# Patient Record
Sex: FEMALE | Race: WHITE | NOT HISPANIC OR LATINO | Employment: OTHER | URBAN - NONMETROPOLITAN AREA
[De-identification: names, ages, dates, MRNs, and addresses within clinical notes are randomized per-mention and may not be internally consistent; named-entity substitution may affect disease eponyms.]

---

## 2023-04-10 ENCOUNTER — TELEPHONE (OUTPATIENT)
Dept: PRIMARY CARE | Facility: CLINIC | Age: 68
End: 2023-04-10
Payer: MEDICARE

## 2023-04-10 NOTE — TELEPHONE ENCOUNTER
Call and tell her to return to the original dose of the hydrochlorothiazide 25 mg once a day and send me results of blood pressures for 1 week,

## 2023-04-10 NOTE — TELEPHONE ENCOUNTER
Pt calling said has been working with you on her bp medications and you upped the hydrochlorothiazide from 25 mg to 50 mg and and bp was looking better but has been waking up dizzy.  Saturday morning was the worse so she went back to 25 mg for the last 3 days and this morning her bp was 155/80 when in office to get her pap.    Please advise?    She said she did send you bp readings to your email.   Ok to leave a message on home phone.

## 2023-05-22 DIAGNOSIS — I10 HYPERTENSION, UNSPECIFIED TYPE: Primary | ICD-10-CM

## 2023-05-22 RX ORDER — HYDROCHLOROTHIAZIDE 25 MG/1
25 TABLET ORAL DAILY
Qty: 90 TABLET | Refills: 0 | Status: SHIPPED | OUTPATIENT
Start: 2023-05-22 | End: 2023-08-21 | Stop reason: SDUPTHER

## 2023-05-22 RX ORDER — HYDROCHLOROTHIAZIDE 25 MG/1
1 TABLET ORAL DAILY
COMMUNITY
Start: 2013-06-19 | End: 2023-08-21 | Stop reason: SDUPTHER

## 2023-07-24 DIAGNOSIS — K21.9 GASTROESOPHAGEAL REFLUX DISEASE WITHOUT ESOPHAGITIS: ICD-10-CM

## 2023-07-24 DIAGNOSIS — M17.0 PRIMARY OSTEOARTHRITIS OF BOTH KNEES: Primary | ICD-10-CM

## 2023-07-24 RX ORDER — MELOXICAM 7.5 MG/1
7.5 TABLET ORAL DAILY
Qty: 90 TABLET | Refills: 3 | Status: SHIPPED | OUTPATIENT
Start: 2023-07-24

## 2023-07-24 RX ORDER — MELOXICAM 7.5 MG/1
7.5 TABLET ORAL DAILY
COMMUNITY
Start: 2015-01-24 | End: 2023-08-21 | Stop reason: SDUPTHER

## 2023-07-24 RX ORDER — OMEPRAZOLE 20 MG/1
20 CAPSULE, DELAYED RELEASE ORAL
COMMUNITY
Start: 2013-06-02 | End: 2023-08-21 | Stop reason: SDUPTHER

## 2023-07-24 RX ORDER — OMEPRAZOLE 20 MG/1
20 CAPSULE, DELAYED RELEASE ORAL
Qty: 90 CAPSULE | Refills: 3 | Status: SHIPPED | OUTPATIENT
Start: 2023-07-24 | End: 2024-05-23

## 2023-08-18 NOTE — PROGRESS NOTES
"Subjective   Patient ID: Kavya Merchant is a 67 y.o. female who presents for Medicare Annual Wellness Visit Subsequent and Hypertension (Discuss leg edema, ).    Past Medical, Surgical, and Family History reviewed and updated in chart.     Reviewed all medications by prescribing practitioner or clinical pharmacist (such as prescriptions, OTCs, herbal therapies and supplements) and documented in the medical record.      HPI    Encounter for subsequent AWV: Medicare wellness visit done, patient is in satisfactory living circumstances where the patient's needs are met.  This patient is advised to develop or update their living will and provide us a copy for the chart.    Hypertension: The patient is here for follow-up of elevated blood pressure. She is adherent to a low salt diet.  The patient is compliant with medications. Patient denies any side effects to the antihypertensive medications. Blood pressure is well controlled at home.No new myalgias or GI upset on diet control.          Review of Systems   All other systems reviewed and are negative.      Patient Care Team:  Gerardo Clark MD as PCP - General  Gerardo Clark MD as PCP - [employer] PCP     Objective   /71 (BP Location: Right arm, Patient Position: Sitting, BP Cuff Size: Adult)   Pulse 77   Temp 36.3 °C (97.3 °F) (Temporal)   Ht 1.664 m (5' 5.5\")   Wt 99.2 kg (218 lb 12.8 oz)   SpO2 97%   BMI 35.86 kg/m²     Physical Exam  Vitals reviewed.   Constitutional:       Appearance: Normal appearance. She is normal weight.   HENT:      Head: Normocephalic and atraumatic.      Right Ear: Tympanic membrane, ear canal and external ear normal.      Left Ear: Tympanic membrane, ear canal and external ear normal.      Nose: Nose normal.      Mouth/Throat:      Mouth: Mucous membranes are moist.      Pharynx: Oropharynx is clear.   Eyes:      Extraocular Movements: Extraocular movements intact.      Conjunctiva/sclera: Conjunctivae normal.      " Pupils: Pupils are equal, round, and reactive to light.   Neck:      Vascular: No carotid bruit.   Cardiovascular:      Rate and Rhythm: Normal rate and regular rhythm.      Pulses: Normal pulses.      Heart sounds: Normal heart sounds. No murmur heard.  Pulmonary:      Effort: Pulmonary effort is normal. No respiratory distress.      Breath sounds: Normal breath sounds. No wheezing, rhonchi or rales.   Abdominal:      General: Abdomen is flat. Bowel sounds are normal.      Palpations: Abdomen is soft. There is no mass.      Tenderness: There is no abdominal tenderness. There is no guarding.   Musculoskeletal:         General: No swelling or deformity. Normal range of motion.      Cervical back: Normal range of motion and neck supple.      Right lower leg: No edema.      Left lower leg: No edema.   Lymphadenopathy:      Cervical: No cervical adenopathy.   Skin:     General: Skin is warm and dry.      Capillary Refill: Capillary refill takes less than 2 seconds.   Neurological:      General: No focal deficit present.      Mental Status: She is alert and oriented to person, place, and time.   Psychiatric:         Mood and Affect: Mood normal.         Behavior: Behavior normal.         Thought Content: Thought content normal.         Judgment: Judgment normal.             Assessment/Plan   #1 hypertension    Blood pressure stable.  Please stay on current doses of hydrochlorothiazide 25 mg a day by controlling her blood pressure we are reducing risk for heart attack and stroke    2.  Bilateral lower leg edema.    Recommending that you purchase compression stockings to be worn for 5 for extended period of time    If your legs continue to have swelling even when wearing the compression socks please contact me I would recommend getting an ultrasound to look for venous insufficiency    3.  Continue on meloxicam to help with generative arthritis of the knee.    4.  Hypothyroid.  Continue on current doses of thyroid  medication    5.  Continue using all allergy medications to help reduce allergy symptoms.    6.  Please have labs done at your convenience we will call you with those results.  We will check kidney function liver function blood sugar cholesterol and thyroid.  We will make sure copies of results go to your endocrinologist    #For the Medicare wellness you are up-to-date with your shingles vaccine both pneumonia vaccines mammogram and colon cancer screening with colonoscopy 2015    If you otherwise stay healthy I will see you back in 6 months we will call with results of your labs you call me if you your    Follow up in: 6 months or sooner if needed with labs prior.    Scribe Attestation  By signing my name below, IIvon Scribe   attest that this documentation has been prepared under the direction and in the presence of Gerardo Clark MD.

## 2023-08-21 ENCOUNTER — LAB (OUTPATIENT)
Dept: LAB | Facility: LAB | Age: 68
End: 2023-08-21
Payer: MEDICARE

## 2023-08-21 ENCOUNTER — OFFICE VISIT (OUTPATIENT)
Dept: PRIMARY CARE | Facility: CLINIC | Age: 68
End: 2023-08-21
Payer: MEDICARE

## 2023-08-21 VITALS
OXYGEN SATURATION: 97 % | HEART RATE: 77 BPM | SYSTOLIC BLOOD PRESSURE: 131 MMHG | BODY MASS INDEX: 35.17 KG/M2 | TEMPERATURE: 97.3 F | WEIGHT: 218.8 LBS | DIASTOLIC BLOOD PRESSURE: 71 MMHG | HEIGHT: 66 IN

## 2023-08-21 DIAGNOSIS — I10 HYPERTENSION, UNSPECIFIED TYPE: ICD-10-CM

## 2023-08-21 DIAGNOSIS — I10 PRIMARY HYPERTENSION: Primary | ICD-10-CM

## 2023-08-21 DIAGNOSIS — M19.012 PRIMARY OSTEOARTHRITIS OF LEFT SHOULDER: ICD-10-CM

## 2023-08-21 DIAGNOSIS — E03.9 HYPOTHYROIDISM, UNSPECIFIED TYPE: ICD-10-CM

## 2023-08-21 DIAGNOSIS — I10 PRIMARY HYPERTENSION: ICD-10-CM

## 2023-08-21 DIAGNOSIS — M17.0 PRIMARY OSTEOARTHRITIS OF BOTH KNEES: ICD-10-CM

## 2023-08-21 DIAGNOSIS — Z00.00 WELLNESS EXAMINATION: ICD-10-CM

## 2023-08-21 DIAGNOSIS — Z00.00 ROUTINE GENERAL MEDICAL EXAMINATION AT HEALTH CARE FACILITY: ICD-10-CM

## 2023-08-21 DIAGNOSIS — J30.89 ALLERGIC RHINITIS DUE TO OTHER ALLERGIC TRIGGER, UNSPECIFIED SEASONALITY: ICD-10-CM

## 2023-08-21 DIAGNOSIS — R73.01 ELEVATED FASTING BLOOD SUGAR: ICD-10-CM

## 2023-08-21 DIAGNOSIS — E66.01 OBESITY, MORBID (MULTI): ICD-10-CM

## 2023-08-21 DIAGNOSIS — R60.0 BILATERAL LEG EDEMA: ICD-10-CM

## 2023-08-21 PROCEDURE — 36415 COLL VENOUS BLD VENIPUNCTURE: CPT

## 2023-08-21 PROCEDURE — 1125F AMNT PAIN NOTED PAIN PRSNT: CPT | Performed by: FAMILY MEDICINE

## 2023-08-21 PROCEDURE — 85025 COMPLETE CBC W/AUTO DIFF WBC: CPT

## 2023-08-21 PROCEDURE — 3078F DIAST BP <80 MM HG: CPT | Performed by: FAMILY MEDICINE

## 2023-08-21 PROCEDURE — 1036F TOBACCO NON-USER: CPT | Performed by: FAMILY MEDICINE

## 2023-08-21 PROCEDURE — 80053 COMPREHEN METABOLIC PANEL: CPT

## 2023-08-21 PROCEDURE — 1160F RVW MEDS BY RX/DR IN RCRD: CPT | Performed by: FAMILY MEDICINE

## 2023-08-21 PROCEDURE — 83735 ASSAY OF MAGNESIUM: CPT

## 2023-08-21 PROCEDURE — G0439 PPPS, SUBSEQ VISIT: HCPCS | Performed by: FAMILY MEDICINE

## 2023-08-21 PROCEDURE — 1159F MED LIST DOCD IN RCRD: CPT | Performed by: FAMILY MEDICINE

## 2023-08-21 PROCEDURE — 84439 ASSAY OF FREE THYROXINE: CPT

## 2023-08-21 PROCEDURE — 3075F SYST BP GE 130 - 139MM HG: CPT | Performed by: FAMILY MEDICINE

## 2023-08-21 PROCEDURE — 84443 ASSAY THYROID STIM HORMONE: CPT

## 2023-08-21 PROCEDURE — 99214 OFFICE O/P EST MOD 30 MIN: CPT | Performed by: FAMILY MEDICINE

## 2023-08-21 PROCEDURE — 80061 LIPID PANEL: CPT

## 2023-08-21 RX ORDER — OLOPATADINE HYDROCHLORIDE 665 UG/1
2 SPRAY NASAL 2 TIMES DAILY
COMMUNITY
Start: 2014-01-24 | End: 2024-02-13 | Stop reason: ALTCHOICE

## 2023-08-21 RX ORDER — LEVOTHYROXINE SODIUM 112 UG/1
112 TABLET ORAL
COMMUNITY
Start: 2013-11-27 | End: 2023-08-25 | Stop reason: ALTCHOICE

## 2023-08-21 RX ORDER — ALBUTEROL SULFATE 90 UG/1
2 AEROSOL, METERED RESPIRATORY (INHALATION) EVERY 4 HOURS PRN
COMMUNITY
Start: 2014-03-25 | End: 2024-02-13 | Stop reason: ALTCHOICE

## 2023-08-21 RX ORDER — AZELASTINE 1 MG/ML
2 SPRAY, METERED NASAL 2 TIMES DAILY
COMMUNITY
Start: 2023-02-14

## 2023-08-21 RX ORDER — MUPIROCIN 20 MG/G
OINTMENT TOPICAL 2 TIMES DAILY
COMMUNITY

## 2023-08-21 RX ORDER — HYDROCHLOROTHIAZIDE 25 MG/1
25 TABLET ORAL DAILY
Qty: 90 TABLET | Refills: 3 | Status: SHIPPED | OUTPATIENT
Start: 2023-08-21 | End: 2024-02-13 | Stop reason: SDUPTHER

## 2023-08-21 RX ORDER — BETAMETHASONE DIPROPIONATE 0.5 MG/G
OINTMENT TOPICAL DAILY
COMMUNITY
Start: 2023-04-10

## 2023-08-21 RX ORDER — METRONIDAZOLE 10 MG/G
GEL TOPICAL DAILY
COMMUNITY
Start: 2013-07-22

## 2023-08-21 RX ORDER — BLACK COHOSH ROOT 540 MG
CAPSULE ORAL
COMMUNITY

## 2023-08-21 RX ORDER — ACETAMINOPHEN 500 MG
250 TABLET ORAL DAILY
COMMUNITY

## 2023-08-21 RX ORDER — HYDROCHLOROTHIAZIDE 25 MG/1
25 TABLET ORAL DAILY
Qty: 90 TABLET | Refills: 3 | Status: SHIPPED | OUTPATIENT
Start: 2023-08-21 | End: 2023-12-05 | Stop reason: WASHOUT

## 2023-08-22 LAB
ALANINE AMINOTRANSFERASE (SGPT) (U/L) IN SER/PLAS: 20 U/L (ref 7–45)
ALBUMIN (G/DL) IN SER/PLAS: 4.6 G/DL (ref 3.4–5)
ALKALINE PHOSPHATASE (U/L) IN SER/PLAS: 92 U/L (ref 33–136)
ANION GAP IN SER/PLAS: 13 MMOL/L (ref 10–20)
ASPARTATE AMINOTRANSFERASE (SGOT) (U/L) IN SER/PLAS: 29 U/L (ref 9–39)
BASOPHILS (10*3/UL) IN BLOOD BY AUTOMATED COUNT: 0.05 X10E9/L (ref 0–0.1)
BASOPHILS/100 LEUKOCYTES IN BLOOD BY AUTOMATED COUNT: 0.8 % (ref 0–2)
BILIRUBIN TOTAL (MG/DL) IN SER/PLAS: 0.7 MG/DL (ref 0–1.2)
CALCIUM (MG/DL) IN SER/PLAS: 10.3 MG/DL (ref 8.6–10.6)
CARBON DIOXIDE, TOTAL (MMOL/L) IN SER/PLAS: 29 MMOL/L (ref 21–32)
CHLORIDE (MMOL/L) IN SER/PLAS: 101 MMOL/L (ref 98–107)
CHOLESTEROL (MG/DL) IN SER/PLAS: 134 MG/DL (ref 0–199)
CHOLESTEROL IN HDL (MG/DL) IN SER/PLAS: 59.4 MG/DL
CHOLESTEROL/HDL RATIO: 2.3
CREATININE (MG/DL) IN SER/PLAS: 0.94 MG/DL (ref 0.5–1.05)
EOSINOPHILS (10*3/UL) IN BLOOD BY AUTOMATED COUNT: 0.16 X10E9/L (ref 0–0.7)
EOSINOPHILS/100 LEUKOCYTES IN BLOOD BY AUTOMATED COUNT: 2.7 % (ref 0–6)
ERYTHROCYTE DISTRIBUTION WIDTH (RATIO) BY AUTOMATED COUNT: 13.2 % (ref 11.5–14.5)
ERYTHROCYTE MEAN CORPUSCULAR HEMOGLOBIN CONCENTRATION (G/DL) BY AUTOMATED: 31.6 G/DL (ref 32–36)
ERYTHROCYTE MEAN CORPUSCULAR VOLUME (FL) BY AUTOMATED COUNT: 93 FL (ref 80–100)
ERYTHROCYTES (10*6/UL) IN BLOOD BY AUTOMATED COUNT: 4.84 X10E12/L (ref 4–5.2)
GFR FEMALE: 66 ML/MIN/1.73M2
GLUCOSE (MG/DL) IN SER/PLAS: 100 MG/DL (ref 74–99)
HEMATOCRIT (%) IN BLOOD BY AUTOMATED COUNT: 45 % (ref 36–46)
HEMOGLOBIN (G/DL) IN BLOOD: 14.2 G/DL (ref 12–16)
IMMATURE GRANULOCYTES/100 LEUKOCYTES IN BLOOD BY AUTOMATED COUNT: 0.2 % (ref 0–0.9)
LDL: 43 MG/DL (ref 0–99)
LEUKOCYTES (10*3/UL) IN BLOOD BY AUTOMATED COUNT: 6 X10E9/L (ref 4.4–11.3)
LYMPHOCYTES (10*3/UL) IN BLOOD BY AUTOMATED COUNT: 1.99 X10E9/L (ref 1.2–4.8)
LYMPHOCYTES/100 LEUKOCYTES IN BLOOD BY AUTOMATED COUNT: 33.4 % (ref 13–44)
MAGNESIUM (MG/DL) IN SER/PLAS: 1.91 MG/DL (ref 1.6–2.4)
MONOCYTES (10*3/UL) IN BLOOD BY AUTOMATED COUNT: 0.53 X10E9/L (ref 0.1–1)
MONOCYTES/100 LEUKOCYTES IN BLOOD BY AUTOMATED COUNT: 8.9 % (ref 2–10)
NEUTROPHILS (10*3/UL) IN BLOOD BY AUTOMATED COUNT: 3.22 X10E9/L (ref 1.2–7.7)
NEUTROPHILS/100 LEUKOCYTES IN BLOOD BY AUTOMATED COUNT: 54 % (ref 40–80)
NRBC (PER 100 WBCS) BY AUTOMATED COUNT: 0 /100 WBC (ref 0–0)
PLATELETS (10*3/UL) IN BLOOD AUTOMATED COUNT: 259 X10E9/L (ref 150–450)
POTASSIUM (MMOL/L) IN SER/PLAS: 3.7 MMOL/L (ref 3.5–5.3)
PROTEIN TOTAL: 7.6 G/DL (ref 6.4–8.2)
SODIUM (MMOL/L) IN SER/PLAS: 139 MMOL/L (ref 136–145)
THYROTROPIN (MIU/L) IN SER/PLAS BY DETECTION LIMIT <= 0.05 MIU/L: 0.25 MIU/L (ref 0.44–3.98)
THYROXINE (T4) FREE (NG/DL) IN SER/PLAS: 1.65 NG/DL (ref 0.78–1.48)
TRIGLYCERIDE (MG/DL) IN SER/PLAS: 158 MG/DL (ref 0–149)
UREA NITROGEN (MG/DL) IN SER/PLAS: 25 MG/DL (ref 6–23)
VLDL: 32 MG/DL (ref 0–40)

## 2023-08-24 ENCOUNTER — TELEPHONE (OUTPATIENT)
Dept: PRIMARY CARE | Facility: CLINIC | Age: 68
End: 2023-08-24
Payer: MEDICARE

## 2023-08-24 NOTE — TELEPHONE ENCOUNTER
Pt calling re her thyroid medication she does not see endo till March 4th and asking if you can please adjust her medication? Please call pt after sent and ok to leave a message.

## 2023-08-25 DIAGNOSIS — E03.9 HYPOTHYROIDISM, UNSPECIFIED TYPE: Primary | ICD-10-CM

## 2023-08-25 RX ORDER — LEVOTHYROXINE SODIUM 100 UG/1
100 TABLET ORAL DAILY
Qty: 30 TABLET | Refills: 11 | Status: SHIPPED | OUTPATIENT
Start: 2023-08-25 | End: 2023-08-28 | Stop reason: SDUPTHER

## 2023-08-25 NOTE — TELEPHONE ENCOUNTER
I sent in the new medication for the lower dose of the thyroid medication and she will need to repeat the labs in 6 weeks, I placed the order for the labs

## 2023-08-25 NOTE — TELEPHONE ENCOUNTER
The patient is asking that the AMANDA be removed from the medication so that she may have the generic form of the medication.

## 2023-08-28 PROBLEM — J30.9 ALLERGIC RHINITIS: Status: ACTIVE | Noted: 2023-08-28

## 2023-08-28 PROBLEM — M17.9 OSTEOARTHRITIS OF KNEE: Status: ACTIVE | Noted: 2023-08-28

## 2023-08-28 PROBLEM — M19.011 ARTHRITIS OF RIGHT ACROMIOCLAVICULAR JOINT: Status: ACTIVE | Noted: 2023-08-28

## 2023-08-28 PROBLEM — Z00.00 WELLNESS EXAMINATION: Status: ACTIVE | Noted: 2023-08-28

## 2023-08-28 PROBLEM — R60.0 BILATERAL LEG EDEMA: Status: ACTIVE | Noted: 2023-08-28

## 2023-08-28 PROBLEM — E55.9 VITAMIN D DEFICIENCY: Status: ACTIVE | Noted: 2017-02-27

## 2023-08-28 PROBLEM — M19.019 DEGENERATIVE ARTHRITIS OF SHOULDER REGION: Status: ACTIVE | Noted: 2023-08-28

## 2023-08-28 PROBLEM — L71.9 ACNE ROSACEA: Status: ACTIVE | Noted: 2023-08-28

## 2023-08-28 PROBLEM — R73.01 ELEVATED FASTING BLOOD SUGAR: Status: ACTIVE | Noted: 2023-08-28

## 2023-08-28 RX ORDER — LEVOTHYROXINE SODIUM 100 UG/1
100 TABLET ORAL DAILY
Qty: 30 TABLET | Refills: 11 | Status: SHIPPED | OUTPATIENT
Start: 2023-08-28 | End: 2024-08-27

## 2023-08-28 NOTE — TELEPHONE ENCOUNTER
I sent in a new prescription for the Synthroid 100 mcg once a day.  I removed the dispense as written label

## 2023-10-11 ENCOUNTER — LAB (OUTPATIENT)
Dept: LAB | Facility: LAB | Age: 68
End: 2023-10-11
Payer: MEDICARE

## 2023-10-11 DIAGNOSIS — E03.9 HYPOTHYROIDISM, UNSPECIFIED TYPE: ICD-10-CM

## 2023-10-11 PROCEDURE — 36415 COLL VENOUS BLD VENIPUNCTURE: CPT

## 2023-10-11 PROCEDURE — 84443 ASSAY THYROID STIM HORMONE: CPT

## 2023-10-12 LAB — TSH SERPL-ACNC: 1.12 MIU/L (ref 0.44–3.98)

## 2023-10-20 ENCOUNTER — TELEPHONE (OUTPATIENT)
Dept: PRIMARY CARE | Facility: CLINIC | Age: 68
End: 2023-10-20
Payer: MEDICARE

## 2023-10-20 DIAGNOSIS — I10 PRIMARY HYPERTENSION: Primary | ICD-10-CM

## 2023-10-20 NOTE — TELEPHONE ENCOUNTER
"Patient calling regarding blood work     Her life insurance was running out, she applied to extend it and they sent in blood work/urine samples to insurance companies    Her rate increased from $97 to $166 because indicators on blood work showed she may have a kidney issue     She was requesting repeat labs be put in since she is now concerned however I told her this was done in August and you noted \"normal kidney function\".    She will try to find another company but she wanted this sent as an FYI       "

## 2023-10-20 NOTE — TELEPHONE ENCOUNTER
Her GFR was above 60 back in August at her last lab test, this is normal.  I can place another order for a kidney function if she wants,  make sure she is well hydrated when she has the repeat labs

## 2023-12-05 ENCOUNTER — OFFICE VISIT (OUTPATIENT)
Dept: PRIMARY CARE | Facility: CLINIC | Age: 68
End: 2023-12-05
Payer: MEDICARE

## 2023-12-05 VITALS
TEMPERATURE: 97 F | HEART RATE: 74 BPM | OXYGEN SATURATION: 96 % | SYSTOLIC BLOOD PRESSURE: 152 MMHG | BODY MASS INDEX: 35.33 KG/M2 | WEIGHT: 215.6 LBS | DIASTOLIC BLOOD PRESSURE: 72 MMHG

## 2023-12-05 DIAGNOSIS — G51.0 BELL'S PALSY: Primary | ICD-10-CM

## 2023-12-05 DIAGNOSIS — Z00.00 WELLNESS EXAMINATION: ICD-10-CM

## 2023-12-05 PROCEDURE — 1159F MED LIST DOCD IN RCRD: CPT | Performed by: FAMILY MEDICINE

## 2023-12-05 PROCEDURE — 3077F SYST BP >= 140 MM HG: CPT | Performed by: FAMILY MEDICINE

## 2023-12-05 PROCEDURE — 1036F TOBACCO NON-USER: CPT | Performed by: FAMILY MEDICINE

## 2023-12-05 PROCEDURE — 1160F RVW MEDS BY RX/DR IN RCRD: CPT | Performed by: FAMILY MEDICINE

## 2023-12-05 PROCEDURE — 1125F AMNT PAIN NOTED PAIN PRSNT: CPT | Performed by: FAMILY MEDICINE

## 2023-12-05 PROCEDURE — 3078F DIAST BP <80 MM HG: CPT | Performed by: FAMILY MEDICINE

## 2023-12-05 PROCEDURE — 99213 OFFICE O/P EST LOW 20 MIN: CPT | Performed by: FAMILY MEDICINE

## 2023-12-05 RX ORDER — LEVOCETIRIZINE DIHYDROCHLORIDE 5 MG/1
TABLET, FILM COATED ORAL
COMMUNITY
Start: 2023-10-21 | End: 2024-02-16 | Stop reason: SDUPTHER

## 2023-12-05 RX ORDER — PREDNISONE 20 MG/1
TABLET ORAL
COMMUNITY
Start: 2023-12-03 | End: 2024-02-13 | Stop reason: ALTCHOICE

## 2023-12-05 RX ORDER — VALACYCLOVIR HYDROCHLORIDE 1 G/1
1000 TABLET, FILM COATED ORAL DAILY
COMMUNITY
Start: 2023-12-03 | End: 2024-02-13 | Stop reason: ALTCHOICE

## 2023-12-05 RX ORDER — BACITRACIN ZINC AND POLYMYXIN B SULFATE 500; 10000 [USP'U]/G; [USP'U]/G
OINTMENT OPHTHALMIC NIGHTLY
Qty: 3.5 G | Refills: 0 | Status: SHIPPED | OUTPATIENT
Start: 2023-12-05 | End: 2023-12-15

## 2023-12-05 ASSESSMENT — ENCOUNTER SYMPTOMS
CARDIOVASCULAR NEGATIVE: 1
ENDOCRINE NEGATIVE: 1
RESPIRATORY NEGATIVE: 1
FACIAL ASYMMETRY: 1
CONSTITUTIONAL NEGATIVE: 1
GASTROINTESTINAL NEGATIVE: 1
EYES NEGATIVE: 1
MUSCULOSKELETAL NEGATIVE: 1
HEMATOLOGIC/LYMPHATIC NEGATIVE: 1
ALLERGIC/IMMUNOLOGIC NEGATIVE: 1
PSYCHIATRIC NEGATIVE: 1

## 2023-12-05 NOTE — PATIENT INSTRUCTIONS
1.  Bell's palsy    You recognized the symptoms quite quickly and appropriately went to the emergency department.  You were correctly placed on prednisone along with Valtrex.    I will add a ointment for your eye to be used at bedtime only if you start noticing you are unable to close the eye at night or you are getting redness to the during the day.    Please finish all medications the prednisone and the Valtrex.  90% of patients are 100% better after 6 weeks.    If you should worsen you need to call me worsening with mean you cannot close your eye at all or the droop on the side of your face is significantly worsening.    You have any symptoms on the left side of your body your left arm or left leg that is a medical emergency you need to go back to the emergency department I do not think that will happen    I will see you back at your next scheduled appointment but am happy to see you sooner if needed

## 2023-12-05 NOTE — PROGRESS NOTES
Subjective   Patient ID: Kavya Merchant is a 68 y.o. female who presents for Follow-up ([facility] Bell's palsy, ).    HPI     The patient had bells palsy on Saturday and was told by her friend. The patient noticed it more on Sunday. The patient reports edema of the face, eyes were not blinking and a droopy mouth. Her sister had a similar problem and has had damage due to it.    The patient denies any changes in vision, hearing or dental.     The patient maintains they do not have any chest pain, chest tightness or shortness of breath.    They do not experience nausea, emesis, changes in bowel movements or dyspepsia.      Review of Systems   Constitutional: Negative.    HENT: Negative.     Eyes: Negative.    Respiratory: Negative.     Cardiovascular: Negative.    Gastrointestinal: Negative.    Endocrine: Negative.    Genitourinary: Negative.    Musculoskeletal: Negative.    Skin: Negative.    Allergic/Immunologic: Negative.    Neurological:  Positive for facial asymmetry.   Hematological: Negative.    Psychiatric/Behavioral: Negative.         Objective   /72 (BP Location: Left arm, Patient Position: Sitting, BP Cuff Size: Large adult)   Pulse 74   Temp 36.1 °C (97 °F) (Temporal)   Wt 97.8 kg (215 lb 9.6 oz)   SpO2 96%   BMI 35.33 kg/m²     Physical Exam  Constitutional:       Appearance: Normal appearance.   HENT:      Head: Normocephalic and atraumatic.      Nose: Nose normal.   Eyes:      Extraocular Movements: Extraocular movements intact.      Conjunctiva/sclera: Conjunctivae normal.      Pupils: Pupils are equal, round, and reactive to light.   Cardiovascular:      Rate and Rhythm: Normal rate and regular rhythm.      Pulses: Normal pulses.      Heart sounds: Normal heart sounds.   Pulmonary:      Effort: Pulmonary effort is normal.      Breath sounds: Normal breath sounds.   Abdominal:      General: Bowel sounds are normal.      Palpations: Abdomen is soft.   Genitourinary:     General: Normal vulva.       Rectum: Normal.   Musculoskeletal:         General: Normal range of motion.      Cervical back: Normal range of motion and neck supple.   Skin:     General: Skin is warm.   Neurological:      Mental Status: She is alert and oriented to person, place, and time.      Comments: Cranial nerve 7 was abnormal on the left side of the face. The rest of the cranial nerves were normal. The rest of her strength and reflexes are normal.   Psychiatric:         Mood and Affect: Mood normal.         Behavior: Behavior normal.         Thought Content: Thought content normal.         Judgment: Judgment normal.         Assessment/Plan   Problem List Items Addressed This Visit             ICD-10-CM    Wellness examination Z00.00    Bell's palsy - Primary G51.0    Relevant Medications    bacitracin-polymyxin B (Polysporin) ophthalmic ointment          1. Bell's palsy        2. Wellness examination          1.  Bell's palsy    You recognized the symptoms quite quickly and appropriately went to the emergency department.  You were correctly placed on prednisone along with Valtrex.    I will add a ointment for your eye to be used at bedtime only if you start noticing you are unable to close the eye at night or you are getting redness to the during the day.    Please finish all medications the prednisone and the Valtrex.  90% of patients are 100% better after 6 weeks.    If you should worsen you need to call me worsening with mean you cannot close your eye at all or the droop on the side of your face is significantly worsening.    You have any symptoms on the left side of your body your left arm or left leg that is a medical emergency you need to go back to the emergency department I do not think that will happen    I will see you back at your next scheduled appointment but am happy to see you sooner if needed.    Follow-up in 6 months or sooner if there are any concerns.    Scribe Attestation  By signing my name below, I, Funmi Myles,  Scribe   attest that this documentation has been prepared under the direction and in the presence of Gerardo Clark MD on 12/05/2023 at 10:35am EST.

## 2024-02-13 ENCOUNTER — LAB (OUTPATIENT)
Dept: LAB | Facility: LAB | Age: 69
End: 2024-02-13
Payer: MEDICARE

## 2024-02-13 ENCOUNTER — OFFICE VISIT (OUTPATIENT)
Dept: PRIMARY CARE | Facility: CLINIC | Age: 69
End: 2024-02-13
Payer: MEDICARE

## 2024-02-13 VITALS
BODY MASS INDEX: 35.99 KG/M2 | WEIGHT: 219.6 LBS | HEART RATE: 79 BPM | SYSTOLIC BLOOD PRESSURE: 128 MMHG | DIASTOLIC BLOOD PRESSURE: 70 MMHG | TEMPERATURE: 97.9 F | OXYGEN SATURATION: 98 %

## 2024-02-13 DIAGNOSIS — Z00.00 WELLNESS EXAMINATION: ICD-10-CM

## 2024-02-13 DIAGNOSIS — E55.9 VITAMIN D DEFICIENCY: ICD-10-CM

## 2024-02-13 DIAGNOSIS — I10 HYPERTENSION, UNSPECIFIED TYPE: ICD-10-CM

## 2024-02-13 DIAGNOSIS — I10 PRIMARY HYPERTENSION: ICD-10-CM

## 2024-02-13 DIAGNOSIS — J30.89 ALLERGIC RHINITIS DUE TO OTHER ALLERGIC TRIGGER, UNSPECIFIED SEASONALITY: ICD-10-CM

## 2024-02-13 DIAGNOSIS — R73.01 ELEVATED FASTING BLOOD SUGAR: ICD-10-CM

## 2024-02-13 DIAGNOSIS — E03.9 ACQUIRED HYPOTHYROIDISM: ICD-10-CM

## 2024-02-13 DIAGNOSIS — E66.01 SEVERE OBESITY (BMI 35.0-35.9 WITH COMORBIDITY) (MULTI): ICD-10-CM

## 2024-02-13 PROCEDURE — 1157F ADVNC CARE PLAN IN RCRD: CPT | Performed by: FAMILY MEDICINE

## 2024-02-13 PROCEDURE — 1036F TOBACCO NON-USER: CPT | Performed by: FAMILY MEDICINE

## 2024-02-13 PROCEDURE — 1125F AMNT PAIN NOTED PAIN PRSNT: CPT | Performed by: FAMILY MEDICINE

## 2024-02-13 PROCEDURE — 3008F BODY MASS INDEX DOCD: CPT | Performed by: FAMILY MEDICINE

## 2024-02-13 PROCEDURE — 3074F SYST BP LT 130 MM HG: CPT | Performed by: FAMILY MEDICINE

## 2024-02-13 PROCEDURE — 84443 ASSAY THYROID STIM HORMONE: CPT

## 2024-02-13 PROCEDURE — 83036 HEMOGLOBIN GLYCOSYLATED A1C: CPT

## 2024-02-13 PROCEDURE — 99214 OFFICE O/P EST MOD 30 MIN: CPT | Performed by: FAMILY MEDICINE

## 2024-02-13 PROCEDURE — 1159F MED LIST DOCD IN RCRD: CPT | Performed by: FAMILY MEDICINE

## 2024-02-13 PROCEDURE — 36415 COLL VENOUS BLD VENIPUNCTURE: CPT

## 2024-02-13 PROCEDURE — 3078F DIAST BP <80 MM HG: CPT | Performed by: FAMILY MEDICINE

## 2024-02-13 PROCEDURE — 82306 VITAMIN D 25 HYDROXY: CPT

## 2024-02-13 PROCEDURE — 1160F RVW MEDS BY RX/DR IN RCRD: CPT | Performed by: FAMILY MEDICINE

## 2024-02-13 PROCEDURE — 80061 LIPID PANEL: CPT

## 2024-02-13 PROCEDURE — 85025 COMPLETE CBC W/AUTO DIFF WBC: CPT

## 2024-02-13 PROCEDURE — 80053 COMPREHEN METABOLIC PANEL: CPT

## 2024-02-13 RX ORDER — HYDROCHLOROTHIAZIDE 25 MG/1
25 TABLET ORAL DAILY
Qty: 90 TABLET | Refills: 3 | Status: SHIPPED | OUTPATIENT
Start: 2024-02-13

## 2024-02-13 RX ORDER — SEMAGLUTIDE 0.68 MG/ML
0.5 INJECTION, SOLUTION SUBCUTANEOUS
Qty: 3 ML | Refills: 2 | Status: SHIPPED | OUTPATIENT
Start: 2024-02-13 | End: 2024-02-13 | Stop reason: SDUPTHER

## 2024-02-13 RX ORDER — SEMAGLUTIDE 0.68 MG/ML
0.5 INJECTION, SOLUTION SUBCUTANEOUS
Qty: 3 ML | Refills: 2 | Status: SHIPPED | OUTPATIENT
Start: 2024-02-13 | End: 2024-02-19

## 2024-02-13 ASSESSMENT — ENCOUNTER SYMPTOMS
EYES NEGATIVE: 1
ENDOCRINE NEGATIVE: 1
MUSCULOSKELETAL NEGATIVE: 1
PSYCHIATRIC NEGATIVE: 1
CARDIOVASCULAR NEGATIVE: 1
ALLERGIC/IMMUNOLOGIC NEGATIVE: 1
CONSTITUTIONAL NEGATIVE: 1
RESPIRATORY NEGATIVE: 1
NEUROLOGICAL NEGATIVE: 1
HEMATOLOGIC/LYMPHATIC NEGATIVE: 1
GASTROINTESTINAL NEGATIVE: 1

## 2024-02-13 NOTE — PROGRESS NOTES
Subjective   Patient ID: Kavya Merchant is a 68 y.o. female who presents for Follow-up (6 mo fuv thy, bells palsy, meds,).    HPI     The patient mentions that her Bell's Palsy has resolved since first affected since 12/2023. She reports no lasting symptoms.    Obesity:  The patient presents today for an evaluation of morbid obesity. The patient is continuously working on diet and exercise. She has struggled with her weight throughout her entire life. She wants to feel better about her weight and keep it off.    Hypothyroidism: Patient presents for evaluation of thyroid function. Energy wise that patient is well. Shestates that within the last month she has not experienced fatigue. The patient condition is currently stable. The patient is currently compliant with their current medication regimen. There are no further concerns at this time.    The patient denies any changes in vision, hearing or dental.     The patient maintains they do not have any chest pain, chest tightness or shortness of breath.    They do not experience nausea, emesis, changes in bowel movements or dyspepsia.    The patient's colonoscopy is up to date.    The patient's mammogram is up to date.    The patient's vaccinations are up to date.         Review of Systems   Constitutional: Negative.    HENT: Negative.     Eyes: Negative.    Respiratory: Negative.     Cardiovascular: Negative.    Gastrointestinal: Negative.    Endocrine: Negative.    Genitourinary: Negative.    Musculoskeletal: Negative.    Skin: Negative.    Allergic/Immunologic: Negative.    Neurological: Negative.    Hematological: Negative.    Psychiatric/Behavioral: Negative.         Objective   /70   Pulse 79   Temp 36.6 °C (97.9 °F) (Temporal)   Wt 99.6 kg (219 lb 9.6 oz)   SpO2 98%   BMI 35.99 kg/m²     Physical Exam  Constitutional:       Appearance: Normal appearance.   HENT:      Head: Normocephalic and atraumatic.      Nose: Nose normal.   Eyes:      Extraocular  Movements: Extraocular movements intact.      Conjunctiva/sclera: Conjunctivae normal.      Pupils: Pupils are equal, round, and reactive to light.   Cardiovascular:      Rate and Rhythm: Normal rate and regular rhythm.      Pulses: Normal pulses.      Heart sounds: Normal heart sounds.   Pulmonary:      Effort: Pulmonary effort is normal.      Breath sounds: Normal breath sounds.   Abdominal:      General: Bowel sounds are normal.      Palpations: Abdomen is soft.   Genitourinary:     General: Normal vulva.      Rectum: Normal.   Musculoskeletal:         General: Normal range of motion.      Cervical back: Normal range of motion and neck supple.   Skin:     General: Skin is warm.   Neurological:      Mental Status: She is alert and oriented to person, place, and time.   Psychiatric:         Mood and Affect: Mood normal.         Behavior: Behavior normal.         Thought Content: Thought content normal.         Judgment: Judgment normal.         Assessment/Plan   Problem List Items Addressed This Visit             ICD-10-CM    Hypertension I10    Relevant Medications    hydroCHLOROthiazide (HYDRODiuril) 25 mg tablet    Other Relevant Orders    CBC and Auto Differential    Comprehensive Metabolic Panel    Lipid Panel    Elevated fasting blood sugar R73.01    Relevant Orders    Hemoglobin A1C    Hypothyroidism E03.9    Relevant Orders    TSH with reflex to Free T4 if abnormal    Vitamin D deficiency E55.9    Relevant Orders    Vitamin D 25-Hydroxy,Total (for eval of Vitamin D levels)    Wellness examination Z00.00    Relevant Orders    Follow Up In [facility] - PCP - Health Maintenance     Other Visit Diagnoses         Codes    BMI 35.0-35.9,adult    -  Primary Z68.35    Relevant Medications    semaglutide (Ozempic) 0.25 mg or 0.5 mg (2 mg/3 mL) pen injector    Severe obesity (BMI 35.0-35.9 with comorbidity) (CMS/[facility])     E66.01, Z68.35    Relevant Medications    semaglutide (Ozempic) 0.25 mg or 0.5 mg (2  mg/3 mL) pen injector               1. BMI 35.0-35.9,adult  semaglutide (Ozempic) 0.25 mg or 0.5 mg (2 mg/3 mL) pen injector    DISCONTINUED: semaglutide (Ozempic) 0.25 mg or 0.5 mg (2 mg/3 mL) pen injector      2. Primary hypertension  Follow Up In [facility] - PCP - Established      3. Hypertension, unspecified type  CBC and Auto Differential    Comprehensive Metabolic Panel    Lipid Panel    hydroCHLOROthiazide (HYDRODiuril) 25 mg tablet      4. Wellness examination  Follow Up In [facility] - PCP - Health Maintenance      5. Elevated fasting blood sugar  Hemoglobin A1C      6. Acquired hypothyroidism  TSH with reflex to Free T4 if abnormal      7. Vitamin D deficiency  Vitamin D 25-Hydroxy,Total (for eval of Vitamin D levels)      8. Severe obesity (BMI 35.0-35.9 with comorbidity) (CMS/HCC)  semaglutide (Ozempic) 0.25 mg or 0.5 mg (2 mg/3 mL) pen injector    DISCONTINUED: semaglutide (Ozempic) 0.25 mg or 0.5 mg (2 mg/3 mL) pen injector        1.  Hypertension    Repeat blood pressure normal    Please stay on current doses of hydrochlorothiazide 25 mg a day by lowering your blood pressure we are reducing risk for heart attack and stroke    We will check electrolytes kidney function with your next set of labs    2.  Hypothyroid    Continue on current dose of levothyroxine 100 mcg a day.  With your neck set of labs we will check her thyroid function as well.  Send copies to your endocrinologist.    3.  Weight loss.    We had a very good discussion regards to your symptoms associated with your weight loss.  You are trying to follow healthy weight watchers diet.  Unfortunately you have not been successful at losing weight.  We discussed the advantages and disadvantages of using medication that will help suppress her appetite.  I am sending in a prescription for Ozempic 0.25 mg injection once a week for 4 weeks please send me a message through [facility] if you are tolerating well with minimal to no  side effects we will increase to 0.5 mg for the second 4 weeks again you will need to message me.  We will slowly increase monthly into your up to 2 mg a week.  In addition and equally as important following the healthy weight watchers diet and trying to walk or exercise be active 30 minutes a day is ideal    4.  Degenerative arthritis knees.  Continue on the meloxicam    5.  Leg edema.  Continue using compression stockings when traveling    6.  GERD.  Continue on the omeprazole for stomach acid    7.  If you are successful with your weight loss your labs returned normal and you remain healthy I will see you in 6 months but we will be in communication through [employer] in regards to your labs and how well you are doing with the Ozempic    Follow-up in 6 months or sooner if there are any concerns.    Scribe Attestation  By signing my name below, IFunmi, Scribe   attest that this documentation has been prepared under the direction and in the presence of Gerardo Clark MD.    This note has been transcribed using a medical scribe and there is a possibility of unintentional typing misprints.

## 2024-02-13 NOTE — PATIENT INSTRUCTIONS
1.  Hypertension    Repeat blood pressure normal    Please stay on current doses of hydrochlorothiazide 25 mg a day by lowering your blood pressure we are reducing risk for heart attack and stroke    We will check electrolytes kidney function with your next set of labs    2.  Hypothyroid    Continue on current dose of levothyroxine 100 mcg a day.  With your neck set of labs we will check her thyroid function as well.  Send copies to your endocrinologist.    3.  Weight loss.    We had a very good discussion regards to your symptoms associated with your weight loss.  You are trying to follow healthy weight watchers diet.  Unfortunately you have not been successful at losing weight.  We discussed the advantages and disadvantages of using medication that will help suppress her appetite.  I am sending in a prescription for Ozempic 0.25 mg injection once a week for 4 weeks please send me a message through [employer] if you are tolerating well with minimal to no side effects we will increase to 0.5 mg for the second 4 weeks again you will need to message me.  We will slowly increase monthly into your up to 2 mg a week.  In addition and equally as important following the healthy weight watchers diet and trying to walk or exercise be active 30 minutes a day is ideal    4.  Degenerative arthritis knees.  Continue on the meloxicam    5.  Leg edema.  Continue using compression stockings when traveling    6.  GERD.  Continue on the omeprazole for stomach acid    7.  If you are successful with your weight loss your labs returned normal and you remain healthy I will see you in 6 months but we will be in communication through [employer] in regards to your labs and how well you are doing with the Ozempic

## 2024-02-13 NOTE — TELEPHONE ENCOUNTER
Pt seen today rx for ozempic and the cost is going to be over $300. Said pharm said will need a prior auth. Pt is requesting PA.

## 2024-02-14 DIAGNOSIS — E03.9 HYPOTHYROIDISM, UNSPECIFIED TYPE: ICD-10-CM

## 2024-02-14 DIAGNOSIS — E55.9 VITAMIN D DEFICIENCY: ICD-10-CM

## 2024-02-14 DIAGNOSIS — J30.89 ALLERGIC RHINITIS DUE TO OTHER ALLERGIC TRIGGER, UNSPECIFIED SEASONALITY: ICD-10-CM

## 2024-02-14 DIAGNOSIS — R73.01 ELEVATED FASTING BLOOD SUGAR: ICD-10-CM

## 2024-02-14 DIAGNOSIS — E78.1 HYPERTRIGLYCERIDEMIA: ICD-10-CM

## 2024-02-14 DIAGNOSIS — Z00.00 WELLNESS EXAMINATION: ICD-10-CM

## 2024-02-14 LAB
25(OH)D3 SERPL-MCNC: 31 NG/ML (ref 30–100)
ALBUMIN SERPL BCP-MCNC: 4.5 G/DL (ref 3.4–5)
ALP SERPL-CCNC: 80 U/L (ref 33–136)
ALT SERPL W P-5'-P-CCNC: 15 U/L (ref 7–45)
ANION GAP SERPL CALC-SCNC: 13 MMOL/L (ref 10–20)
AST SERPL W P-5'-P-CCNC: 23 U/L (ref 9–39)
BASOPHILS # BLD AUTO: 0.05 X10*3/UL (ref 0–0.1)
BASOPHILS NFR BLD AUTO: 0.9 %
BILIRUB SERPL-MCNC: 0.7 MG/DL (ref 0–1.2)
BUN SERPL-MCNC: 20 MG/DL (ref 6–23)
CALCIUM SERPL-MCNC: 10 MG/DL (ref 8.6–10.6)
CHLORIDE SERPL-SCNC: 103 MMOL/L (ref 98–107)
CHOLEST SERPL-MCNC: 149 MG/DL (ref 0–199)
CHOLESTEROL/HDL RATIO: 2.6
CO2 SERPL-SCNC: 31 MMOL/L (ref 21–32)
CREAT SERPL-MCNC: 0.96 MG/DL (ref 0.5–1.05)
EGFRCR SERPLBLD CKD-EPI 2021: 64 ML/MIN/1.73M*2
EOSINOPHIL # BLD AUTO: 0.21 X10*3/UL (ref 0–0.7)
EOSINOPHIL NFR BLD AUTO: 3.8 %
ERYTHROCYTE [DISTWIDTH] IN BLOOD BY AUTOMATED COUNT: 13.5 % (ref 11.5–14.5)
EST. AVERAGE GLUCOSE BLD GHB EST-MCNC: 114 MG/DL
GLUCOSE SERPL-MCNC: 95 MG/DL (ref 74–99)
HBA1C MFR BLD: 5.6 %
HCT VFR BLD AUTO: 42.8 % (ref 36–46)
HDLC SERPL-MCNC: 56.3 MG/DL
HGB BLD-MCNC: 13.9 G/DL (ref 12–16)
IMM GRANULOCYTES # BLD AUTO: 0.01 X10*3/UL (ref 0–0.7)
IMM GRANULOCYTES NFR BLD AUTO: 0.2 % (ref 0–0.9)
LDLC SERPL CALC-MCNC: 51 MG/DL
LYMPHOCYTES # BLD AUTO: 2.03 X10*3/UL (ref 1.2–4.8)
LYMPHOCYTES NFR BLD AUTO: 36.6 %
MCH RBC QN AUTO: 29.8 PG (ref 26–34)
MCHC RBC AUTO-ENTMCNC: 32.5 G/DL (ref 32–36)
MCV RBC AUTO: 92 FL (ref 80–100)
MONOCYTES # BLD AUTO: 0.4 X10*3/UL (ref 0.1–1)
MONOCYTES NFR BLD AUTO: 7.2 %
NEUTROPHILS # BLD AUTO: 2.85 X10*3/UL (ref 1.2–7.7)
NEUTROPHILS NFR BLD AUTO: 51.3 %
NON HDL CHOLESTEROL: 93 MG/DL (ref 0–149)
NRBC BLD-RTO: 0 /100 WBCS (ref 0–0)
PLATELET # BLD AUTO: 209 X10*3/UL (ref 150–450)
POTASSIUM SERPL-SCNC: 3.7 MMOL/L (ref 3.5–5.3)
PROT SERPL-MCNC: 6.9 G/DL (ref 6.4–8.2)
RBC # BLD AUTO: 4.67 X10*6/UL (ref 4–5.2)
SODIUM SERPL-SCNC: 143 MMOL/L (ref 136–145)
TRIGL SERPL-MCNC: 210 MG/DL (ref 0–149)
TSH SERPL-ACNC: 0.94 MIU/L (ref 0.44–3.98)
VLDL: 42 MG/DL (ref 0–40)
WBC # BLD AUTO: 5.6 X10*3/UL (ref 4.4–11.3)

## 2024-02-14 NOTE — RESULT ENCOUNTER NOTE
Recent labs indicate normal total cholesterol 149 HDL cholesterol normal at 56 LDL cholesterol normal at 51 triglycerides are elevated 210 goal for triglycerides being less than 150.  Vitamin D level 31 normal.  Blood sugar normal at 95 electrolytes are normal kidney function tests are normal liver enzymes are normal.  Thyroid function test is normal indicating you are taking the correct dose of thyroid medication.  Hemoglobin A1c 5.6 indicating no diabetes.  Blood counts are normal indicating no anemia.  Please stay on all current medications keep all future follow-up appointments if possible have lab work done prior to your next appointment so we can discuss the results during the appointment I will place the orders today

## 2024-02-16 RX ORDER — LEVOCETIRIZINE DIHYDROCHLORIDE 5 MG/1
TABLET, FILM COATED ORAL
Qty: 90 TABLET | Refills: 3 | Status: CANCELLED | OUTPATIENT
Start: 2024-02-16

## 2024-02-16 RX ORDER — LEVOCETIRIZINE DIHYDROCHLORIDE 5 MG/1
5 TABLET, FILM COATED ORAL EVERY EVENING
Qty: 90 TABLET | Refills: 3 | Status: SHIPPED | OUTPATIENT
Start: 2024-02-16 | End: 2025-02-15

## 2024-02-19 ENCOUNTER — TELEPHONE (OUTPATIENT)
Dept: PRIMARY CARE | Facility: CLINIC | Age: 69
End: 2024-02-19
Payer: MEDICARE

## 2024-02-26 DIAGNOSIS — E66.01 OBESITY, MORBID (MULTI): Primary | ICD-10-CM

## 2024-03-11 ENCOUNTER — TELEMEDICINE (OUTPATIENT)
Dept: PHARMACY | Facility: HOSPITAL | Age: 69
End: 2024-03-11
Payer: MEDICARE

## 2024-03-11 DIAGNOSIS — E66.01 OBESITY, MORBID (MULTI): ICD-10-CM

## 2024-03-11 PROCEDURE — 3008F BODY MASS INDEX DOCD: CPT | Performed by: FAMILY MEDICINE

## 2024-03-11 PROCEDURE — 1160F RVW MEDS BY RX/DR IN RCRD: CPT | Performed by: FAMILY MEDICINE

## 2024-03-11 PROCEDURE — 1125F AMNT PAIN NOTED PAIN PRSNT: CPT | Performed by: FAMILY MEDICINE

## 2024-03-11 PROCEDURE — 1157F ADVNC CARE PLAN IN RCRD: CPT | Performed by: FAMILY MEDICINE

## 2024-03-11 PROCEDURE — 1159F MED LIST DOCD IN RCRD: CPT | Performed by: FAMILY MEDICINE

## 2024-03-11 PROCEDURE — 1036F TOBACCO NON-USER: CPT | Performed by: FAMILY MEDICINE

## 2024-03-11 NOTE — PROGRESS NOTES
Subjective   Patient ID: Kavya Merchant is a 68 y.o. female who presents for Obesity.    Referring Provider: Gerardo Clark MD     HPI  [facility] significant for hypothyroidism and arthritis. Has participated in Weight Wat[employer] for several years. She keeps active with gardening and yardwork over 2 acres of property. She takes Zepbound on Saturdays and started on 3/2/24. Appetite has decreased and no side effects reported so far. She has lost 4 lbs since 3/2/24.    Review of Systems    Medication System Management:  Affordability/Accessibility: No issues  Adherence/Organization: No issues  Adverse Effects: None    Objective     There were no vitals taken for this visit.     Labs  Lab Results   Component Value Date    BILITOT 0.7 02/13/2024    CALCIUM 10.0 02/13/2024    CO2 31 02/13/2024     02/13/2024    CREATININE 0.96 02/13/2024    GLUCOSE 95 02/13/2024    ALKPHOS 80 02/13/2024    K 3.7 02/13/2024    PROT 6.9 02/13/2024     02/13/2024    AST 23 02/13/2024    ALT 15 02/13/2024    BUN 20 02/13/2024    ANIONGAP 13 02/13/2024    MG 1.91 08/21/2023    ALBUMIN 4.5 02/13/2024    GFRF 66 08/21/2023     Lab Results   Component Value Date    TRIG 210 (H) 02/13/2024    CHOL 149 02/13/2024    LDLCALC 51 02/13/2024    HDL 56.3 02/13/2024     Lab Results   Component Value Date    HGBA1C 5.6 02/13/2024       Current Outpatient Medications on File Prior to Visit   Medication Sig Dispense Refill    azelastine (Astelin) 137 mcg (0.1 %) nasal spray Administer 2 sprays into each nostril 2 times a day.      betamethasone dipropionate (Diprolene) 0.05 % ointment Apply topically once daily.      black cohosh 540 mg capsule Take by mouth.      cholecalciferol (Vitamin D-3) 50 mcg (2,000 unit) capsule Take 5 capsules (250 mcg) by mouth early in the morning..      hydroCHLOROthiazide (HYDRODiuril) 25 mg tablet Take 1 tablet (25 mg) by mouth once daily. 90 tablet 3    levocetirizine (Xyzal) 5 mg tablet Take 1 tablet (5 mg) by mouth once  daily in the evening. 90 tablet 3    levothyroxine (Synthroid) 100 mcg tablet Take 1 tablet (100 mcg) by mouth once daily. 30 tablet 11    meloxicam (Mobic) 7.5 mg tablet TAKE 1 TABLET DAILY 90 tablet 3    metroNIDAZOLE (MetrogeL) 1 % gel Apply topically once daily.      mupirocin (Bactroban) 2 % ointment Apply topically 2 times a day.      omeprazole (PriLOSEC) 20 mg DR capsule TAKE 1 CAPSULE DAILY EVERY MORNING BEFORE BREAKFAST 90 capsule 3    tirzepatide, weight loss, (Zepbound) 2.5 mg/0.5 mL injection Inject 2.5 mg under the skin every 7 days. 4 each 2     No current facility-administered medications on file prior to visit.        Assessment/Plan   Problem List Items Addressed This Visit             ICD-10-CM    Obesity, morbid (CMS/[facility]) E66.01     Patient started Zepbound 2.5 mg on 3/2/24 and has taken 2 doses so far without side effects. She has lost 4 lbs since starting.  -INCREASE Zepbound to 5 mg weekly once finished with Zepbound 2.5 mg.    PharmD Follow-Up: 4/16/24            Torrie Hampton, PharmD    Continue all meds under the continuation of care with the referring provider and clinical pharmacy team.    Verbal consent to manage patient's drug therapy was obtained from the patient. They were informed they may decline to participate or withdraw from participation in pharmacy services at any time.

## 2024-03-11 NOTE — ASSESSMENT & PLAN NOTE
Patient started Zepbound 2.5 mg on 3/2/24 and has taken 2 doses so far without side effects. She has lost 4 lbs since starting.  -INCREASE Zepbound to 5 mg weekly once finished with Zepbound 2.5 mg.    PharmD Follow-Up: 4/16/24

## 2024-04-16 ENCOUNTER — TELEMEDICINE (OUTPATIENT)
Dept: PHARMACY | Facility: HOSPITAL | Age: 69
End: 2024-04-16
Payer: MEDICARE

## 2024-04-16 DIAGNOSIS — E66.01 OBESITY, MORBID (MULTI): ICD-10-CM

## 2024-04-16 NOTE — PROGRESS NOTES
Subjective   Patient ID: Kavya Merchant is a 68 y.o. female who presents for Weight Loss.    Referring Provider: Gerardo Clark MD     [facility] significant for hypothyroidism and arthritis. Has participated in Weight [employer] for several years. She keeps active with gardening and yardwork over 2 acres of property. She takes Zepbound on Saturdays and started on 3/2/24. Appetite has decreased and no side effects reported so far. She has lost 11 lbs since 3/2/24.    Starting Weight: 219 lbs   Current Weight: 208 lbs     Review of Systems    Medication System Management:  Affordability/Accessibility: No issues  Adherence/Organization: No issues  Adverse Effects: None    Objective     There were no vitals taken for this visit.     Labs  Lab Results   Component Value Date    BILITOT 0.7 02/13/2024    CALCIUM 10.0 02/13/2024    CO2 31 02/13/2024     02/13/2024    CREATININE 0.96 02/13/2024    GLUCOSE 95 02/13/2024    ALKPHOS 80 02/13/2024    K 3.7 02/13/2024    PROT 6.9 02/13/2024     02/13/2024    AST 23 02/13/2024    ALT 15 02/13/2024    BUN 20 02/13/2024    ANIONGAP 13 02/13/2024    MG 1.91 08/21/2023    ALBUMIN 4.5 02/13/2024    GFRF 66 08/21/2023     Lab Results   Component Value Date    TRIG 210 (H) 02/13/2024    CHOL 149 02/13/2024    LDLCALC 51 02/13/2024    HDL 56.3 02/13/2024     Lab Results   Component Value Date    HGBA1C 5.6 02/13/2024       Current Outpatient Medications on File Prior to Visit   Medication Sig Dispense Refill    azelastine (Astelin) 137 mcg (0.1 %) nasal spray Administer 2 sprays into each nostril 2 times a day.      betamethasone dipropionate (Diprolene) 0.05 % ointment Apply topically once daily.      black cohosh 540 mg capsule Take by mouth.      cholecalciferol (Vitamin D-3) 50 mcg (2,000 unit) capsule Take 5 capsules (250 mcg) by mouth early in the morning..      hydroCHLOROthiazide (HYDRODiuril) 25 mg tablet Take 1 tablet (25 mg) by mouth once daily. 90 tablet 3     levocetirizine (Xyzal) 5 mg tablet Take 1 tablet (5 mg) by mouth once daily in the evening. 90 tablet 3    levothyroxine (Synthroid) 100 mcg tablet Take 1 tablet (100 mcg) by mouth once daily. 30 tablet 11    meloxicam (Mobic) 7.5 mg tablet TAKE 1 TABLET DAILY 90 tablet 3    metroNIDAZOLE (MetrogeL) 1 % gel Apply topically once daily.      mupirocin (Bactroban) 2 % ointment Apply topically 2 times a day.      omeprazole (PriLOSEC) 20 mg DR capsule TAKE 1 CAPSULE DAILY EVERY MORNING BEFORE BREAKFAST 90 capsule 3    tirzepatide, weight loss, (Zepbound) 5 mg/0.5 mL injection Inject 5 mg under the skin every 7 days. 2 mL 1     No current facility-administered medications on file prior to visit.        Assessment/Plan   Problem List Items Addressed This Visit             ICD-10-CM    Obesity, morbid (Multi) E66.01     Patient started Zepbound 5 mg on 3/2/24 and reports no side effects. She has lost 11 lbs since starting.    PLAN:  -INCREASE to Zepbound to 7.5 mg weekly once finished with Zepbound 5 mg.      PharmD Follow-Up: 7 weeks per patient request due to vacation            Jameson HerreraD    Continue all meds under the continuation of care with the referring provider and clinical pharmacy team.    Verbal consent to manage patient's drug therapy was obtained from the patient. They were informed they may decline to participate or withdraw from participation in pharmacy services at any time.

## 2024-04-16 NOTE — ASSESSMENT & PLAN NOTE
Patient started Zepbound 5 mg on 3/2/24 and reports no side effects. She has lost 11 lbs since starting.    PLAN:  -INCREASE to Zepbound to 7.5 mg weekly once finished with Zepbound 5 mg.      PharmD Follow-Up: 7 weeks per patient request due to vacation

## 2024-04-23 ENCOUNTER — TELEPHONE (OUTPATIENT)
Dept: PRIMARY CARE | Facility: CLINIC | Age: 69
End: 2024-04-23

## 2024-04-23 DIAGNOSIS — E66.01 OBESITY, MORBID (MULTI): Primary | ICD-10-CM

## 2024-04-23 DIAGNOSIS — E66.01 OBESITY, MORBID (MULTI): ICD-10-CM

## 2024-04-23 NOTE — TELEPHONE ENCOUNTER
Meghan from [facility] in [place]      [facility] only has the 2.5 mgof the zepbound. They need the directions for the 2.5 mg of the zepbound to say inject 5 mg every 7 day.       [phone]

## 2024-05-23 DIAGNOSIS — K21.9 GASTROESOPHAGEAL REFLUX DISEASE WITHOUT ESOPHAGITIS: ICD-10-CM

## 2024-05-23 RX ORDER — OMEPRAZOLE 20 MG/1
20 CAPSULE, DELAYED RELEASE ORAL
Qty: 90 CAPSULE | Refills: 3 | Status: SHIPPED | OUTPATIENT
Start: 2024-05-23

## 2024-06-04 ENCOUNTER — TELEMEDICINE (OUTPATIENT)
Dept: PHARMACY | Facility: HOSPITAL | Age: 69
End: 2024-06-04
Payer: MEDICARE

## 2024-06-04 DIAGNOSIS — E66.01 OBESITY, MORBID (MULTI): ICD-10-CM

## 2024-06-04 NOTE — PROGRESS NOTES
Subjective   Patient ID: Kavya Merchant is a 68 y.o. female who presents for Weight Loss.    Referring Provider: Gearrdo Clark MD     [facility] significant for hypothyroidism and arthritis. Has participated in Weight [employer] for several years. She keeps active with gardening and yardwork over 2 acres of property. She takes Zepbound on Saturdays and started on 3/2/24. Appetite has decreased and no side effects reported so far. She has lost 19 lbs since 3/2/24.    Starting Weight: 219 lbs   Current Weight: 203 lbs     Review of Systems    Medication System Management:  Affordability/Accessibility: No issues  Adherence/Organization: No issues  Adverse Effects: None    Objective     There were no vitals taken for this visit.     Labs  Lab Results   Component Value Date    BILITOT 0.7 02/13/2024    CALCIUM 10.0 02/13/2024    CO2 31 02/13/2024     02/13/2024    CREATININE 0.96 02/13/2024    GLUCOSE 95 02/13/2024    ALKPHOS 80 02/13/2024    K 3.7 02/13/2024    PROT 6.9 02/13/2024     02/13/2024    AST 23 02/13/2024    ALT 15 02/13/2024    BUN 20 02/13/2024    ANIONGAP 13 02/13/2024    MG 1.91 08/21/2023    ALBUMIN 4.5 02/13/2024    GFRF 66 08/21/2023     Lab Results   Component Value Date    TRIG 210 (H) 02/13/2024    CHOL 149 02/13/2024    LDLCALC 51 02/13/2024    HDL 56.3 02/13/2024     Lab Results   Component Value Date    HGBA1C 5.6 02/13/2024       Current Outpatient Medications on File Prior to Visit   Medication Sig Dispense Refill    azelastine (Astelin) 137 mcg (0.1 %) nasal spray Administer 2 sprays into each nostril 2 times a day.      betamethasone dipropionate (Diprolene) 0.05 % ointment Apply topically once daily.      black cohosh 540 mg capsule Take by mouth.      cholecalciferol (Vitamin D-3) 50 mcg (2,000 unit) capsule Take 5 capsules (250 mcg) by mouth early in the morning..      hydroCHLOROthiazide (HYDRODiuril) 25 mg tablet Take 1 tablet (25 mg) by mouth once daily. 90 tablet 3     levocetirizine (Xyzal) 5 mg tablet Take 1 tablet (5 mg) by mouth once daily in the evening. 90 tablet 3    levothyroxine (Synthroid) 100 mcg tablet Take 1 tablet (100 mcg) by mouth once daily. 30 tablet 11    meloxicam (Mobic) 7.5 mg tablet TAKE 1 TABLET DAILY 90 tablet 3    metroNIDAZOLE (MetrogeL) 1 % gel Apply topically once daily.      mupirocin (Bactroban) 2 % ointment Apply topically 2 times a day.      omeprazole (PriLOSEC) 20 mg DR capsule TAKE 1 CAPSULE DAILY EVERY MORNING BEFORE BREAKFAST 90 capsule 3    tirzepatide, weight loss, (Zepbound) 2.5 mg/0.5 mL injection Inject 5 mg under the skin every 7 days. 4 mL 3    tirzepatide, weight loss, (Zepbound) 5 mg/0.5 mL injection Inject 5 mg under the skin every 7 days. 2 mL 1    tirzepatide, weight loss, (Zepbound) 7.5 mg/0.5 mL injection Inject 7.5 mg under the skin every 7 days. 2 mL 0     No current facility-administered medications on file prior to visit.        Assessment/Plan   Problem List Items Addressed This Visit             ICD-10-CM    Obesity, morbid (Multi) E66.01     Patient was referred for titration of Zepbound for weight loss.  Since started Zepbound 5 mg on 3/2/24 and reports no side effects. She has lost 19 lbs total.  Patient has 1 month supply of 5 mg at home.  Will discuss increasing the dose at next visit if available at pharmacy.      PLAN:  - Continue Zepbound 5 mg once weekly injection due to backorder of other doses.        PharmD Follow-Up: 3 weeks          Relevant Orders    Follow Up In [facility] - Pharmacy       Snow Wooten PharmD    Continue all meds under the continuation of care with the referring provider and clinical pharmacy team.    Verbal consent to manage patient's drug therapy was obtained from the patient. They were informed they may decline to participate or withdraw from participation in pharmacy services at any time.

## 2024-06-04 NOTE — ASSESSMENT & PLAN NOTE
Patient was referred for titration of Zepbound for weight loss.  Since started Zepbound 5 mg on 3/2/24 and reports no side effects. She has lost 19 lbs total.  Patient has 1 month supply of 5 mg at home.  Will discuss increasing the dose at next visit if available at pharmacy.      PLAN:  - Continue Zepbound 5 mg once weekly injection due to backorder of other doses.        PharmD Follow-Up: 3 weeks

## 2024-06-25 ENCOUNTER — APPOINTMENT (OUTPATIENT)
Dept: PHARMACY | Facility: HOSPITAL | Age: 69
End: 2024-06-25
Payer: MEDICARE

## 2024-06-25 ENCOUNTER — LAB (OUTPATIENT)
Dept: LAB | Facility: LAB | Age: 69
End: 2024-06-25
Payer: MEDICARE

## 2024-06-25 DIAGNOSIS — E66.01 OBESITY, MORBID (MULTI): ICD-10-CM

## 2024-06-25 DIAGNOSIS — E55.9 VITAMIN D DEFICIENCY: ICD-10-CM

## 2024-06-25 DIAGNOSIS — R73.01 ELEVATED FASTING BLOOD SUGAR: ICD-10-CM

## 2024-06-25 DIAGNOSIS — E03.9 HYPOTHYROIDISM, UNSPECIFIED TYPE: ICD-10-CM

## 2024-06-25 DIAGNOSIS — E78.1 HYPERTRIGLYCERIDEMIA: ICD-10-CM

## 2024-06-25 LAB
25(OH)D3 SERPL-MCNC: 38 NG/ML (ref 30–100)
ALBUMIN SERPL BCP-MCNC: 4.3 G/DL (ref 3.4–5)
ALP SERPL-CCNC: 94 U/L (ref 33–136)
ALT SERPL W P-5'-P-CCNC: 14 U/L (ref 7–45)
ANION GAP SERPL CALC-SCNC: 13 MMOL/L (ref 10–20)
AST SERPL W P-5'-P-CCNC: 23 U/L (ref 9–39)
BASOPHILS # BLD AUTO: 0.05 X10*3/UL (ref 0–0.1)
BASOPHILS NFR BLD AUTO: 0.9 %
BILIRUB SERPL-MCNC: 0.7 MG/DL (ref 0–1.2)
BUN SERPL-MCNC: 32 MG/DL (ref 6–23)
CALCIUM SERPL-MCNC: 9.7 MG/DL (ref 8.6–10.6)
CHLORIDE SERPL-SCNC: 104 MMOL/L (ref 98–107)
CHOLEST SERPL-MCNC: 107 MG/DL (ref 0–199)
CHOLESTEROL/HDL RATIO: 1.8
CO2 SERPL-SCNC: 28 MMOL/L (ref 21–32)
CREAT SERPL-MCNC: 1.06 MG/DL (ref 0.5–1.05)
EGFRCR SERPLBLD CKD-EPI 2021: 57 ML/MIN/1.73M*2
EOSINOPHIL # BLD AUTO: 0.21 X10*3/UL (ref 0–0.7)
EOSINOPHIL NFR BLD AUTO: 4 %
ERYTHROCYTE [DISTWIDTH] IN BLOOD BY AUTOMATED COUNT: 13.2 % (ref 11.5–14.5)
EST. AVERAGE GLUCOSE BLD GHB EST-MCNC: 100 MG/DL
GLUCOSE SERPL-MCNC: 95 MG/DL (ref 74–99)
HBA1C MFR BLD: 5.1 %
HCT VFR BLD AUTO: 44.1 % (ref 36–46)
HDLC SERPL-MCNC: 57.9 MG/DL
HGB BLD-MCNC: 14.2 G/DL (ref 12–16)
IMM GRANULOCYTES # BLD AUTO: 0.01 X10*3/UL (ref 0–0.7)
IMM GRANULOCYTES NFR BLD AUTO: 0.2 % (ref 0–0.9)
LDLC SERPL CALC-MCNC: 28 MG/DL
LYMPHOCYTES # BLD AUTO: 1.85 X10*3/UL (ref 1.2–4.8)
LYMPHOCYTES NFR BLD AUTO: 35.1 %
MCH RBC QN AUTO: 29.7 PG (ref 26–34)
MCHC RBC AUTO-ENTMCNC: 32.2 G/DL (ref 32–36)
MCV RBC AUTO: 92 FL (ref 80–100)
MONOCYTES # BLD AUTO: 0.41 X10*3/UL (ref 0.1–1)
MONOCYTES NFR BLD AUTO: 7.8 %
NEUTROPHILS # BLD AUTO: 2.74 X10*3/UL (ref 1.2–7.7)
NEUTROPHILS NFR BLD AUTO: 52 %
NON HDL CHOLESTEROL: 49 MG/DL (ref 0–149)
NRBC BLD-RTO: 0 /100 WBCS (ref 0–0)
PLATELET # BLD AUTO: 241 X10*3/UL (ref 150–450)
POTASSIUM SERPL-SCNC: 3.9 MMOL/L (ref 3.5–5.3)
PROT SERPL-MCNC: 6.9 G/DL (ref 6.4–8.2)
RBC # BLD AUTO: 4.78 X10*6/UL (ref 4–5.2)
SODIUM SERPL-SCNC: 141 MMOL/L (ref 136–145)
TRIGL SERPL-MCNC: 104 MG/DL (ref 0–149)
TSH SERPL-ACNC: 0.72 MIU/L (ref 0.44–3.98)
VLDL: 21 MG/DL (ref 0–40)
WBC # BLD AUTO: 5.3 X10*3/UL (ref 4.4–11.3)

## 2024-06-25 PROCEDURE — 36415 COLL VENOUS BLD VENIPUNCTURE: CPT

## 2024-06-25 PROCEDURE — 84443 ASSAY THYROID STIM HORMONE: CPT

## 2024-06-25 PROCEDURE — 85025 COMPLETE CBC W/AUTO DIFF WBC: CPT

## 2024-06-25 PROCEDURE — 82306 VITAMIN D 25 HYDROXY: CPT

## 2024-06-25 PROCEDURE — 83036 HEMOGLOBIN GLYCOSYLATED A1C: CPT

## 2024-06-25 PROCEDURE — 80061 LIPID PANEL: CPT

## 2024-06-25 PROCEDURE — 80053 COMPREHEN METABOLIC PANEL: CPT

## 2024-06-25 NOTE — PROGRESS NOTES
Subjective   Patient ID: Kavya Merchant is a 68 y.o. female who presents for Weight Loss.    Referring Provider: Gerardo Clark MD     [facility] significant for hypothyroidism and arthritis. Has participated in Weight [employer] for several years. She keeps active with gardening and yardwork over 2 acres of property. She takes Zepbound on Saturdays and started on 3/2/24. Appetite has decreased and no side effects reported so far. She has lost 19 lbs since 3/2/24.    Starting Weight: 219 lbs   Current Weight: 198 lbs     Review of Systems    Medication System Management:  Affordability/Accessibility: No issues  Adherence/Organization: No issues  Adverse Effects: None    Objective     There were no vitals taken for this visit.     Labs  Lab Results   Component Value Date    BILITOT 0.7 06/25/2024    CALCIUM 9.7 06/25/2024    CO2 28 06/25/2024     06/25/2024    CREATININE 1.06 (H) 06/25/2024    GLUCOSE 95 06/25/2024    ALKPHOS 94 06/25/2024    K 3.9 06/25/2024    PROT 6.9 06/25/2024     06/25/2024    AST 23 06/25/2024    ALT 14 06/25/2024    BUN 32 (H) 06/25/2024    ANIONGAP 13 06/25/2024    MG 1.91 08/21/2023    ALBUMIN 4.3 06/25/2024    GFRF 66 08/21/2023     Lab Results   Component Value Date    TRIG 104 06/25/2024    CHOL 107 06/25/2024    LDLCALC 28 06/25/2024    HDL 57.9 06/25/2024     Lab Results   Component Value Date    HGBA1C 5.1 06/25/2024       Current Outpatient Medications on File Prior to Visit   Medication Sig Dispense Refill    azelastine (Astelin) 137 mcg (0.1 %) nasal spray Administer 2 sprays into each nostril 2 times a day.      betamethasone dipropionate (Diprolene) 0.05 % ointment Apply topically once daily.      black cohosh 540 mg capsule Take by mouth.      cholecalciferol (Vitamin D-3) 50 mcg (2,000 unit) capsule Take 5 capsules (250 mcg) by mouth early in the morning..      hydroCHLOROthiazide (HYDRODiuril) 25 mg tablet Take 1 tablet (25 mg) by mouth once daily. 90 tablet 3     levocetirizine (Xyzal) 5 mg tablet Take 1 tablet (5 mg) by mouth once daily in the evening. 90 tablet 3    levothyroxine (Synthroid) 100 mcg tablet Take 1 tablet (100 mcg) by mouth once daily. 30 tablet 11    meloxicam (Mobic) 7.5 mg tablet TAKE 1 TABLET DAILY 90 tablet 3    metroNIDAZOLE (MetrogeL) 1 % gel Apply topically once daily.      mupirocin (Bactroban) 2 % ointment Apply topically 2 times a day.      omeprazole (PriLOSEC) 20 mg DR capsule TAKE 1 CAPSULE DAILY EVERY MORNING BEFORE BREAKFAST 90 capsule 3    tirzepatide, weight loss, (Zepbound) 7.5 mg/0.5 mL injection Inject 7.5 mg under the skin every 7 days. 2 mL 0    [DISCONTINUED] tirzepatide, weight loss, (Zepbound) 2.5 mg/0.5 mL injection Inject 5 mg under the skin every 7 days. 4 mL 3    [DISCONTINUED] tirzepatide, weight loss, (Zepbound) 5 mg/0.5 mL injection Inject 5 mg under the skin every 7 days. 2 mL 1     No current facility-administered medications on file prior to visit.        Assessment/Plan   Problem List Items Addressed This Visit             ICD-10-CM    Obesity, morbid (Multi) E66.01     Patient was referred for titration of Zepbound for weight loss.  Since started Zepbound 5 mg the patient reports no side effects. She has lost 23 lbs total. Will discuss increasing the dose at next visit if available at pharmacy.      PLAN:  - Continue Zepbound 5 mg once weekly injection due to backorder of other doses.        PharmD Follow-Up: 3 weeks          Relevant Medications    tirzepatide, weight loss, (Zepbound) 5 mg/0.5 mL injection    Other Relevant Orders    Follow Up In [facility] - Pharmacy       Jameson HerreraD    Continue all meds under the continuation of care with the referring provider and clinical pharmacy team.    Verbal consent to manage patient's drug therapy was obtained from the patient. They were informed they may decline to participate or withdraw from participation in pharmacy services at any time.

## 2024-06-25 NOTE — ASSESSMENT & PLAN NOTE
Patient was referred for titration of Zepbound for weight loss.  Since started Zepbound 5 mg the patient reports no side effects. She has lost 23 lbs total. Will discuss increasing the dose at next visit if available at pharmacy.      PLAN:  - Continue Zepbound 5 mg once weekly injection due to backorder of other doses.        PharmD Follow-Up: 3 weeks

## 2024-07-16 ENCOUNTER — APPOINTMENT (OUTPATIENT)
Dept: PHARMACY | Facility: HOSPITAL | Age: 69
End: 2024-07-16
Payer: MEDICARE

## 2024-07-16 DIAGNOSIS — E66.01 OBESITY, MORBID (MULTI): ICD-10-CM

## 2024-07-16 PROCEDURE — RXMED WILLOW AMBULATORY MEDICATION CHARGE

## 2024-07-16 NOTE — ASSESSMENT & PLAN NOTE
Patient was referred for titration of Zepbound for weight loss.  Since started Zepbound 5 mg the patient reports no side effects. She has lost 23 lbs total. Will discuss increasing the dose and patient was agreeable.      PLAN:  - Continue Zepbound 7.5 mg once weekly injection Prescription sent to [facility] pharmacy to be mailed to patient.      PharmD Follow-Up: 4 weeks

## 2024-07-16 NOTE — PROGRESS NOTES
Subjective   Patient ID: Kavya Merchant is a 68 y.o. female who presents for Weight Loss.    Referring Provider: Gerardo Clark MD     [facility] significant for hypothyroidism and arthritis. Has participated in Weight [employer] for several years. She keeps active with gardening and yardwork over 2 acres of property. She takes Zepbound on Saturdays and started on 3/2/24. Appetite has decreased and no side effects reported so far. She has lost 19 lbs since 3/2/24.    Starting Weight: 219 lbs   Current Weight: 198 lbs     Review of Systems    Medication System Management:  Affordability/Accessibility: No issues  Adherence/Organization: No issues  Adverse Effects: None    Objective     There were no vitals taken for this visit.     Labs  Lab Results   Component Value Date    BILITOT 0.7 06/25/2024    CALCIUM 9.7 06/25/2024    CO2 28 06/25/2024     06/25/2024    CREATININE 1.06 (H) 06/25/2024    GLUCOSE 95 06/25/2024    ALKPHOS 94 06/25/2024    K 3.9 06/25/2024    PROT 6.9 06/25/2024     06/25/2024    AST 23 06/25/2024    ALT 14 06/25/2024    BUN 32 (H) 06/25/2024    ANIONGAP 13 06/25/2024    MG 1.91 08/21/2023    ALBUMIN 4.3 06/25/2024    GFRF 66 08/21/2023     Lab Results   Component Value Date    TRIG 104 06/25/2024    CHOL 107 06/25/2024    LDLCALC 28 06/25/2024    HDL 57.9 06/25/2024     Lab Results   Component Value Date    HGBA1C 5.1 06/25/2024       Current Outpatient Medications on File Prior to Visit   Medication Sig Dispense Refill    azelastine (Astelin) 137 mcg (0.1 %) nasal spray Administer 2 sprays into each nostril 2 times a day.      betamethasone dipropionate (Diprolene) 0.05 % ointment Apply topically once daily.      black cohosh 540 mg capsule Take by mouth.      cholecalciferol (Vitamin D-3) 50 mcg (2,000 unit) capsule Take 5 capsules (250 mcg) by mouth early in the morning..      hydroCHLOROthiazide (HYDRODiuril) 25 mg tablet Take 1 tablet (25 mg) by mouth once daily. 90 tablet 3     levocetirizine (Xyzal) 5 mg tablet Take 1 tablet (5 mg) by mouth once daily in the evening. 90 tablet 3    levothyroxine (Synthroid) 100 mcg tablet Take 1 tablet (100 mcg) by mouth once daily. 30 tablet 11    meloxicam (Mobic) 7.5 mg tablet TAKE 1 TABLET DAILY 90 tablet 3    metroNIDAZOLE (MetrogeL) 1 % gel Apply topically once daily.      mupirocin (Bactroban) 2 % ointment Apply topically 2 times a day.      omeprazole (PriLOSEC) 20 mg DR capsule TAKE 1 CAPSULE DAILY EVERY MORNING BEFORE BREAKFAST 90 capsule 3    [DISCONTINUED] tirzepatide, weight loss, (Zepbound) 5 mg/0.5 mL injection Inject 5 mg under the skin every 7 days. 2 mL 0    [DISCONTINUED] tirzepatide, weight loss, (Zepbound) 7.5 mg/0.5 mL injection Inject 7.5 mg under the skin every 7 days. 2 mL 0     No current facility-administered medications on file prior to visit.        Assessment/Plan   Problem List Items Addressed This Visit             ICD-10-CM    Obesity, morbid (Multi) E66.01     Patient was referred for titration of Zepbound for weight loss.  Since started Zepbound 5 mg the patient reports no side effects. She has lost 23 lbs total. Will discuss increasing the dose and patient was agreeable.      PLAN:  - Continue Zepbound 7.5 mg once weekly injection Prescription sent to [facility] pharmacy to be mailed to patient.      PharmD Follow-Up: 4 weeks          Relevant Medications    tirzepatide, weight loss, (Zepbound) 7.5 mg/0.5 mL injection    Other Relevant Orders    Follow Up In [facility] - Pharmacy       Snow Wooten, PharmD    Continue all meds under the continuation of care with the referring provider and clinical pharmacy team.    Verbal consent to manage patient's drug therapy was obtained from the patient. They were informed they may decline to participate or withdraw from participation in pharmacy services at any time.

## 2024-07-17 DIAGNOSIS — E03.9 HYPOTHYROIDISM, UNSPECIFIED TYPE: ICD-10-CM

## 2024-07-17 DIAGNOSIS — M17.0 PRIMARY OSTEOARTHRITIS OF BOTH KNEES: ICD-10-CM

## 2024-07-17 RX ORDER — MELOXICAM 7.5 MG/1
7.5 TABLET ORAL DAILY
Qty: 90 TABLET | Refills: 3 | Status: SHIPPED | OUTPATIENT
Start: 2024-07-17

## 2024-07-17 RX ORDER — LEVOTHYROXINE SODIUM 100 UG/1
100 TABLET ORAL DAILY
Qty: 90 TABLET | Refills: 3 | Status: SHIPPED | OUTPATIENT
Start: 2024-07-17 | End: 2025-07-17

## 2024-07-18 ENCOUNTER — PHARMACY VISIT (OUTPATIENT)
Dept: PHARMACY | Facility: CLINIC | Age: 69
End: 2024-07-18
Payer: MEDICARE

## 2024-08-14 ENCOUNTER — APPOINTMENT (OUTPATIENT)
Dept: PHARMACY | Facility: HOSPITAL | Age: 69
End: 2024-08-14
Payer: MEDICARE

## 2024-08-14 DIAGNOSIS — E66.01 OBESITY, MORBID (MULTI): ICD-10-CM

## 2024-08-14 PROCEDURE — RXMED WILLOW AMBULATORY MEDICATION CHARGE

## 2024-08-14 NOTE — ASSESSMENT & PLAN NOTE
[address] [place] [address] ED  eMERGENCY dEPARTMENT eNCOUnter      Pt Name: Nicole Stone  MRN: [id number]  [place] 1968  Date of evaluation: 1/6/2022  Provider: Flo Nichols MD    CHIEF COMPLAINT       Chief Complaint   Patient presents with    Foreign Body in Eye     FB in left eye from work, pipe dough, still feels like there is some in there. HISTORY OF PRESENT ILLNESS   (Location/Symptom, Timing/Onset,Context/Setting, Quality, Duration, Modifying Factors, Severity)  Note limiting factors. Nicole Stone is a 46 y.o. male who presents to the emergency department with complaint of foreign body of pipe dough to left eye at work just prior to presentation. Patient irrigated immediately for 10 minutes. Denies vision loss or vision changes. Has foreign body sensation. Denies any other symptoms. HPI    Nursing Notes were reviewed. REVIEW OF SYSTEMS    (2-9 systems for level 4, 10 or more for level 5)     Review of Systems   Constitutional: Negative. Negative for activity change, appetite change, chills, fatigue and fever. HENT: Negative for congestion, ear discharge, ear pain, hearing loss, rhinorrhea, sinus pressure and sore throat. Eyes: Positive for discharge, redness and itching. Negative for photophobia, pain and visual disturbance. Respiratory: Negative for apnea, cough, shortness of breath and wheezing. Cardiovascular: Negative for chest pain, palpitations and leg swelling. Gastrointestinal: Negative for abdominal distention, abdominal pain, constipation, diarrhea, nausea and vomiting. Endocrine: Negative for cold intolerance, heat intolerance and polyuria. Genitourinary: Negative for dysuria, flank pain, frequency and urgency. Musculoskeletal: Negative for arthralgias, back pain, gait problem, myalgias and neck stiffness. Skin: Negative for color change, pallor and rash. Allergic/Immunologic: Negative for food allergies and immunocompromised state. Patient was referred for titration of Zepbound for weight loss.  Since started Zepbound 7.5 mg the patient reports no side effects. She has lost 25 lbs total. Will discuss increasing the dose, however patient is still losing weight and decision was made to continue current dose for 1 more month.      PLAN:  - Continue Zepbound 7.5 mg once weekly injection Prescription sent to [facility] pharmacy to be mailed to patient.      PharmD Follow-Up: 4 weeks    Neurological: Negative for dizziness, tremors, syncope, weakness, light-headedness and headaches. Psychiatric/Behavioral: Negative for agitation, confusion and hallucinations. All other systems reviewed and are negative. Except as noted above the remainder of the review of systems was reviewed and negative. PAST MEDICAL HISTORY     Past Medical History:   Diagnosis Date    Anxiety     Erectile dysfunction     GERD (gastroesophageal reflux disease)     Hyperlipidemia          SURGICAL HISTORY       Past Surgical History:   Procedure Laterality Date    CARDIAC CATHETERIZATION  2011    COLONOSCOPY N/A 10/25/2019    COLORECTAL CANCER SCREENING, NOT HIGH RISK performed by Reyna Madsen MD at [address] [place] [address]       Previous Medications    ASPIRIN 81 MG TABLET    Take 81 mg by mouth daily    CLOTRIMAZOLE-BETAMETHASONE (LOTRISONE) 1-0.05 % CREAM    APPLY TO AFFECTED AREAS ON fooT TWICE A DAY FOR ONE MONTH AND THEN ONCE A DAY FOR ONE MONTH; REPEAT FOR RECURRENCE. MULTIPLE VITAMINS-MINERALS (THERAPEUTIC MULTIVITAMIN-MINERALS) TABLET    Take 1 tablet by mouth daily    SALICYLIC ACID-LACTIC ACID 17 % EXTERNAL SOLUTION    Apply topically once daily for 14 days       ALLERGIES     Patient has no known allergies.     FAMILY HISTORY       Family History   Problem Relation Age of Onset    Vision Loss Mother 72    Heart Disease Father     Heart Attack Father           SOCIAL HISTORY       Social History     Socioeconomic History    Marital status:      Spouse name: Corena Party    Number of children: 11    Years of education: None    Highest education level: None   Occupational History    Occupation:      Employer: [employer]     Comment: [employer] [place]    Tobacco Use    Smoking status: Former Smoker     Packs/day: 0.50     Years: 10.00     Pack years: 5.00     Types: Cigarettes     Start date: 1/1/1987     Quit date: 1997     Years since quittin.7    Smokeless tobacco: Never Used   Vaping Use    Vaping Use: Never used   Substance and Sexual Activity    Alcohol use: Yes     Comment: occasional    Drug use: No    Sexual activity: Yes     Partners: Female   Other Topics Concern    None   Social History Narrative    None     Social Determinants of Health     Financial Resource Strain: Low Risk     Difficulty of Paying Living Expenses: Not hard at all   Food Insecurity: No Food Insecurity    Worried About Running Out of Food in the Last Year: Never true    Rita of Food in the Last Year: Never true   Transportation Needs: No Transportation Needs    Lack of Transportation (Medical): No    Lack of Transportation (Non-Medical): No   Physical Activity:     Days of Exercise per Week: Not on file    Minutes of Exercise per Session: Not on file   Stress:     Feeling of Stress : Not on file   Social Connections:     Frequency of Communication with Friends and Family: Not on file    Frequency of Social Gatherings with Friends and Family: Not on file    Attends Shinto Services: Not on file    Active Member of [address] [place] [address] or Organizations: Not on file    Attends Club or Organization Meetings: Not on file    Marital Status: Not on file   Intimate Partner Violence:     Fear of Current or Ex-Partner: Not on file    Emotionally Abused: Not on file    Physically Abused: Not on file    Sexually Abused: Not on file   Housing Stability:     Unable to Pay for Housing in the Last Year: Not on file    Number of [place] in the Last Year: Not on file    Unstable Housing in the Last Year: Not on file       SCREENINGS             PHYSICAL EXAM    (up to 7 for level 4, 8 or more for level 5)     ED Triage Vitals [22 1805]   BP Temp Temp Source Pulse Resp SpO2 Height Weight   (!) 144/92 98.1 °F (36.7 °C) Temporal 81 18 96 % 5' 5\" (1.651 m) 200 lb (90.7 kg)       Physical Exam  Vitals and nursing note reviewed. Constitutional:       General: He is not in acute distress. Appearance: Normal appearance. He is well-developed. He is obese. He is not ill-appearing, toxic-appearing or diaphoretic. HENT:      Head: Normocephalic and atraumatic. Nose: Nose normal. No congestion or rhinorrhea. Mouth/Throat:      Mouth: Mucous membranes are moist.      Pharynx: Oropharynx is clear. No oropharyngeal exudate or posterior oropharyngeal erythema. Eyes:      General: No scleral icterus. Right eye: No discharge. Left eye: Discharge present. Extraocular Movements: Extraocular movements intact. Pupils: Pupils are equal, round, and reactive to light. Neck:      Thyroid: No thyromegaly. Vascular: No carotid bruit or JVD. Trachea: No tracheal deviation. Cardiovascular:      Rate and Rhythm: Normal rate and regular rhythm. Pulses: Normal pulses. Heart sounds: Normal heart sounds. No murmur heard. No friction rub. No gallop. Pulmonary:      Effort: Pulmonary effort is normal. No respiratory distress. Breath sounds: Normal breath sounds. No stridor. No wheezing, rhonchi or rales. Chest:      Chest wall: No tenderness. Abdominal:      General: Abdomen is flat. Bowel sounds are normal. There is no distension. Palpations: Abdomen is soft. There is no mass. Tenderness: There is no abdominal tenderness. There is no right CVA tenderness, left CVA tenderness, guarding or rebound. Hernia: No hernia is present. Musculoskeletal:         General: No swelling, tenderness, deformity or signs of injury. Normal range of motion. Cervical back: Normal range of motion and neck supple. No rigidity or tenderness. Right lower leg: No edema. Left lower leg: No edema. Lymphadenopathy:      Cervical: No cervical adenopathy. Skin:     General: Skin is warm and dry. Capillary Refill: Capillary refill takes less than 2 seconds.       Coloration: Skin is not jaundiced or pale. Findings: No bruising, erythema, lesion or rash. Neurological:      General: No focal deficit present. Mental Status: He is alert and oriented to person, place, and time. Mental status is at baseline. Cranial Nerves: No cranial nerve deficit. Sensory: No sensory deficit. Motor: No weakness or abnormal muscle tone. Coordination: Coordination normal.      Gait: Gait normal.      Deep Tendon Reflexes: Reflexes are normal and symmetric. Reflexes normal.   Psychiatric:         Mood and Affect: Mood normal.         Behavior: Behavior normal.         Thought Content: Thought content normal.         Judgment: Judgment normal.         DIAGNOSTIC RESULTS     EKG: All EKG's are interpreted by the Emergency Department Physician who either signs or Co-signs this chart in the absence of a cardiologist.        RADIOLOGY:   Non-plain film images such as CT, Ultrasound and MRI are read by the radiologist. Skeet Dredge radiographicimages are visualized and preliminarily interpreted by the emergency physician with the below findings:        Interpretation per the Radiologist below, if available at the time of this note:    No orders to display         ED BEDSIDE ULTRASOUND:   Performed by ED Physician - none    LABS:  Labs Reviewed - No data to display    All other labs were within normal range or not returned as of this dictation.     EMERGENCY DEPARTMENT COURSE and DIFFERENTIALDIAGNOSIS/MDM:   Vitals:    Vitals:    01/06/22 1805   BP: (!) 144/92   Pulse: 81   Resp: 18   Temp: 98.1 °F (36.7 °C)   TempSrc: Temporal   SpO2: 96%   Weight: 200 lb (90.7 kg)   Height: 5' 5\" (1.651 m)           MDM  Number of Diagnoses or Management Options  Risk of Complications, Morbidity, and/or Mortality  Presenting problems: moderate  Diagnostic procedures: moderate  Management options: moderate    Patient Progress  Patient progress: improved      CRITICAL CARE TIME   Total Critical Care time was  minutes, excluding separately reportable procedures. There was a high probability of clinically significant/life threatening deterioration in the patient's condition which required my urgentintervention. CONSULTS:  None    PROCEDURES:  Unless otherwise noted below, none     Foreign Body Occular    Date/Time: 1/6/2022 6:25 PM  Performed by: Edenilson Lyons MD  Authorized by: Edenilson Lyons MD     Consent:     Consent obtained:  Verbal and written    Consent given by:  Patient    Risks discussed:  Bleeding, globe perforation, pain, visual impairment, incomplete removal, corneal damage, damage to surrounding structures, infection and worsening of condition    Alternatives discussed:  No treatment, delayed treatment, alternative treatment, observation and referral  Location:     Location:  L corneal    Depth:  Superficial  Pre-procedure details:     Imaging:  None    Fluorescein exam: yes      Fluorescein uptake: yes      Corneal abrasion location:  Lateral  Anesthesia (see MAR for exact dosages):     Local anesthetic:  Tetracaine drops  Procedure details:     Localization method:  Direct visualization    Foreign bodies recovered:  None    Intact foreign body removal: no      Residual rust ring observed: no      Residual rust ring removed: no    Post-procedure details:     Dressing:  Antibiotic ointment    Patient tolerance of procedure: Tolerated well, no immediate complications        FINAL IMPRESSION      1.  Abrasion of left cornea, initial encounter          DISPOSITION/PLAN   DISPOSITION        PATIENT REFERRED TO:  Jaun Read MD  [address] [place] [address]  [phone]    In 3 days      DO José Antonio Mcdonaldclair  [phone]    In 1 day        DISCHARGE MEDICATIONS:  New Prescriptions    ERYTHROMYCIN [facility]) 5 MG/GM OPHTHALMIC OINTMENT    Apply to left 4 times a day          (Please note that portions of this note were completed with a voice recognitionprogram. Efforts were made to edit the dictations but occasionally words are mis-transcribed.)    Tasia Lomeli MD (electronically signed)  Attending Emergency Physician          Tasia Lomeli MD  01/06/22 423 E 23 MD [place]  01/06/22 190

## 2024-08-14 NOTE — PROGRESS NOTES
Subjective   Patient ID: Kavya Merchant is a 68 y.o. female who presents for Weight Loss.    Referring Provider: Gerardo Clark MD     [facility] significant for hypothyroidism and arthritis. Has participated in Weight Wat[employer] for several years. She keeps active with gardening and yardwork over 2 acres of property. She takes Zepbound on Saturdays and started on 3/2/24. Appetite has decreased and no side effects reported so far. She has lost 25 lbs since 3/2/24.    Starting Weight: 219 lbs   Current Weight: 191 lbs     Review of Systems    Medication System Management:  Affordability/Accessibility: No issues  Adherence/Organization: No issues  Adverse Effects: None    Objective     There were no vitals taken for this visit.     Labs  Lab Results   Component Value Date    BILITOT 0.7 06/25/2024    CALCIUM 9.7 06/25/2024    CO2 28 06/25/2024     06/25/2024    CREATININE 1.06 (H) 06/25/2024    GLUCOSE 95 06/25/2024    ALKPHOS 94 06/25/2024    K 3.9 06/25/2024    PROT 6.9 06/25/2024     06/25/2024    AST 23 06/25/2024    ALT 14 06/25/2024    BUN 32 (H) 06/25/2024    ANIONGAP 13 06/25/2024    MG 1.91 08/21/2023    ALBUMIN 4.3 06/25/2024    GFRF 66 08/21/2023     Lab Results   Component Value Date    TRIG 104 06/25/2024    CHOL 107 06/25/2024    LDLCALC 28 06/25/2024    HDL 57.9 06/25/2024     Lab Results   Component Value Date    HGBA1C 5.1 06/25/2024       Current Outpatient Medications on File Prior to Visit   Medication Sig Dispense Refill    azelastine (Astelin) 137 mcg (0.1 %) nasal spray Administer 2 sprays into each nostril 2 times a day.      betamethasone dipropionate (Diprolene) 0.05 % ointment Apply topically once daily.      black cohosh 540 mg capsule Take by mouth.      cholecalciferol (Vitamin D-3) 50 mcg (2,000 unit) capsule Take 5 capsules (250 mcg) by mouth early in the morning..      hydroCHLOROthiazide (HYDRODiuril) 25 mg tablet Take 1 tablet (25 mg) by mouth once daily. 90 tablet 3     levocetirizine (Xyzal) 5 mg tablet Take 1 tablet (5 mg) by mouth once daily in the evening. 90 tablet 3    levothyroxine (Synthroid) 100 mcg tablet Take 1 tablet (100 mcg) by mouth once daily. 90 tablet 3    meloxicam (Mobic) 7.5 mg tablet Take 1 tablet (7.5 mg) by mouth once daily. 90 tablet 3    metroNIDAZOLE (MetrogeL) 1 % gel Apply topically once daily.      mupirocin (Bactroban) 2 % ointment Apply topically 2 times a day.      omeprazole (PriLOSEC) 20 mg DR capsule TAKE 1 CAPSULE DAILY EVERY MORNING BEFORE BREAKFAST 90 capsule 3    [DISCONTINUED] tirzepatide, weight loss, (Zepbound) 7.5 mg/0.5 mL injection Inject 7.5 mg under the skin every 7 days. 2 mL 0     No current facility-administered medications on file prior to visit.        Assessment/Plan   Problem List Items Addressed This Visit             ICD-10-CM    Obesity, morbid (Multi) E66.01     Patient was referred for titration of Zepbound for weight loss.  Since started Zepbound 7.5 mg the patient reports no side effects. She has lost 25 lbs total. Will discuss increasing the dose, however patient is still losing weight and decision was made to continue current dose for 1 more month.      PLAN:  - Continue Zepbound 7.5 mg once weekly injection Prescription sent to [facility] pharmacy to be mailed to patient.      PharmD Follow-Up: 4 weeks          Relevant Medications    tirzepatide, weight loss, (Zepbound) 7.5 mg/0.5 mL injection    Other Relevant Orders    Follow Up In [facility] Pharmacy       Jameson HerreraD    Continue all meds under the continuation of care with the referring provider and clinical pharmacy team.    Verbal consent to manage patient's drug therapy was obtained from the patient. They were informed they may decline to participate or withdraw from participation in pharmacy services at any time.

## 2024-08-15 ENCOUNTER — PHARMACY VISIT (OUTPATIENT)
Dept: PHARMACY | Facility: CLINIC | Age: 69
End: 2024-08-15
Payer: MEDICARE

## 2024-08-26 ENCOUNTER — APPOINTMENT (OUTPATIENT)
Dept: PRIMARY CARE | Facility: CLINIC | Age: 69
End: 2024-08-26
Payer: MEDICARE

## 2024-08-26 VITALS
TEMPERATURE: 97.5 F | WEIGHT: 196.8 LBS | BODY MASS INDEX: 32.79 KG/M2 | DIASTOLIC BLOOD PRESSURE: 70 MMHG | HEIGHT: 65 IN | SYSTOLIC BLOOD PRESSURE: 117 MMHG | OXYGEN SATURATION: 96 % | HEART RATE: 79 BPM

## 2024-08-26 DIAGNOSIS — E66.09 CLASS 1 OBESITY DUE TO EXCESS CALORIES WITH SERIOUS COMORBIDITY AND BODY MASS INDEX (BMI) OF 33.0 TO 33.9 IN ADULT: ICD-10-CM

## 2024-08-26 DIAGNOSIS — R73.9 ELEVATED BLOOD SUGAR LEVEL: ICD-10-CM

## 2024-08-26 DIAGNOSIS — Z00.00 MEDICARE ANNUAL WELLNESS VISIT, SUBSEQUENT: Primary | ICD-10-CM

## 2024-08-26 DIAGNOSIS — Z12.31 BREAST CANCER SCREENING BY MAMMOGRAM: ICD-10-CM

## 2024-08-26 DIAGNOSIS — E55.9 VITAMIN D DEFICIENCY: ICD-10-CM

## 2024-08-26 DIAGNOSIS — I10 HYPERTENSION, UNSPECIFIED TYPE: ICD-10-CM

## 2024-08-26 DIAGNOSIS — J30.89 ALLERGIC RHINITIS DUE TO OTHER ALLERGIC TRIGGER, UNSPECIFIED SEASONALITY: ICD-10-CM

## 2024-08-26 DIAGNOSIS — Z00.00 WELLNESS EXAMINATION: ICD-10-CM

## 2024-08-26 DIAGNOSIS — Z78.0 POST-MENOPAUSE: ICD-10-CM

## 2024-08-26 DIAGNOSIS — M17.0 PRIMARY OSTEOARTHRITIS OF BOTH KNEES: ICD-10-CM

## 2024-08-26 DIAGNOSIS — E03.9 HYPOTHYROIDISM, UNSPECIFIED TYPE: ICD-10-CM

## 2024-08-26 DIAGNOSIS — E78.5 HYPERLIPIDEMIA, UNSPECIFIED HYPERLIPIDEMIA TYPE: ICD-10-CM

## 2024-08-26 PROCEDURE — 99214 OFFICE O/P EST MOD 30 MIN: CPT | Performed by: FAMILY MEDICINE

## 2024-08-26 PROCEDURE — 3078F DIAST BP <80 MM HG: CPT | Performed by: FAMILY MEDICINE

## 2024-08-26 PROCEDURE — G0439 PPPS, SUBSEQ VISIT: HCPCS | Performed by: FAMILY MEDICINE

## 2024-08-26 PROCEDURE — 3074F SYST BP LT 130 MM HG: CPT | Performed by: FAMILY MEDICINE

## 2024-08-26 PROCEDURE — 1157F ADVNC CARE PLAN IN RCRD: CPT | Performed by: FAMILY MEDICINE

## 2024-08-26 PROCEDURE — 1160F RVW MEDS BY RX/DR IN RCRD: CPT | Performed by: FAMILY MEDICINE

## 2024-08-26 PROCEDURE — 1123F ACP DISCUSS/DSCN MKR DOCD: CPT | Performed by: FAMILY MEDICINE

## 2024-08-26 PROCEDURE — 1170F FXNL STATUS ASSESSED: CPT | Performed by: FAMILY MEDICINE

## 2024-08-26 PROCEDURE — 1159F MED LIST DOCD IN RCRD: CPT | Performed by: FAMILY MEDICINE

## 2024-08-26 PROCEDURE — 3008F BODY MASS INDEX DOCD: CPT | Performed by: FAMILY MEDICINE

## 2024-08-26 RX ORDER — AZELASTINE 1 MG/ML
2 SPRAY, METERED NASAL 2 TIMES DAILY
Qty: 90 ML | Refills: 3 | Status: SHIPPED | OUTPATIENT
Start: 2024-08-26 | End: 2025-08-26

## 2024-08-26 ASSESSMENT — ENCOUNTER SYMPTOMS
MUSCULOSKELETAL NEGATIVE: 1
CONSTITUTIONAL NEGATIVE: 1
ALLERGIC/IMMUNOLOGIC NEGATIVE: 1
NEUROLOGICAL NEGATIVE: 1
SHORTNESS OF BREATH: 0
VOMITING: 0
ENDOCRINE NEGATIVE: 1
RESPIRATORY NEGATIVE: 1
HEMATOLOGIC/LYMPHATIC NEGATIVE: 1
EYES NEGATIVE: 1
PSYCHIATRIC NEGATIVE: 1
DIARRHEA: 0
CARDIOVASCULAR NEGATIVE: 1
GASTROINTESTINAL NEGATIVE: 1
CHEST TIGHTNESS: 0
NAUSEA: 0

## 2024-08-26 ASSESSMENT — ACTIVITIES OF DAILY LIVING (ADL)
TAKING_MEDICATION: INDEPENDENT
DOING_HOUSEWORK: INDEPENDENT
DRESSING: INDEPENDENT
BATHING: INDEPENDENT
MANAGING_FINANCES: INDEPENDENT
GROCERY_SHOPPING: INDEPENDENT

## 2024-08-26 ASSESSMENT — PATIENT HEALTH QUESTIONNAIRE - PHQ9
SUM OF ALL RESPONSES TO PHQ9 QUESTIONS 1 AND 2: 0
1. LITTLE INTEREST OR PLEASURE IN DOING THINGS: NOT AT ALL
2. FEELING DOWN, DEPRESSED OR HOPELESS: NOT AT ALL

## 2024-08-26 NOTE — PATIENT INSTRUCTIONS
1.  Medicare wellness visit    Today in the office you had your annual Medicare wellness visit    You are up-to-date with your colonoscopy for colon cancer screening.    You are up-to-date with your mammogram for breast cancer screening.    Please check with your pharmacist when you get your flu shot in October it would be reasonable to also get a tetanus shot you could wait 2 to 4 weeks after your flu shot.    You are up-to-date with your shingles vaccines and your pneumonia vaccines    Keep eating a heart healthy diet you have done an outstanding job with weight loss congratulations.  A good goal for your diet 5-7 servings of fresh fruit vegetable every day along with lean protein avoid the simple sugars avoid the fast foods    Keep being active 30 minutes of activity every day is ideal certainly you would call if you have chest pains with your activities    Continue using the Xyzal and the Astelin nasal spray for your allergic rhinitis and allergy symptoms    Continue on hydrochlorothiazide 25 mg a day to help lower your blood pressure which is reducing your risk for heart attack and stroke.  Your recent blood pressure is normal.  If you continue to have significant weight loss and you could experience lightheadedness when you go from a sitting to a standing position if this happens check the blood pressure call me we may need to reduce or eliminate the hydrochlorothiazide    Continue on your thyroid medication    Continue on the Zepbound continue working with our pharmacist in regards to increasing the dosing.  After you have been on the higher dose after about a month please repeat your labs.  The labs from June are normal    Continue on the meloxicam to help with arthritis pain    Continue on the omeprazole to prevent acid indigestion    If you otherwise stay healthy I will see you in 6 months we will contact you with results of labs you will contact me if anything changes

## 2024-08-26 NOTE — PROGRESS NOTES
Subjective   Patient ID: Kavya Merchant is a 68 y.o. female who presents for Medicare Annual Wellness Visit Subsequent (MWA).    HPI     Hypertension: The patient is here for an evaluation of elevated blood pressure. The patient is trying to follow a low-salt diet. She is adherent to a low salt diet. Blood pressure is well controlled at home, with ranges being in not doing. The patient has not been hospitalized for this in the last 6 months. The patient is compliant with medications, which are currently  hydrochlorothiazide . Patient denies any side effects to the medications.     Hypothyroidism: Patient presents for evaluation of thyroid function. Energy wise that patient is well. Shestates that within the last month she has not experienced fatigue. T    Osteoarthritis: The patient is presenting today with symptoms of osteoporosis. The patient has not been hospitalized for this in the last 6 months. The patient is compliant with medications. Patient denies any side effects to the medications.     The patient denies any changes in vision, hearing or dental.     The patient maintains they do not have any chest pain, chest tightness or shortness of breath.    They do not experience nausea, emesis, changes in bowel movements or dyspepsia.    The patient's colonoscopy is up to date.    The patient's mammogram is up to date.    The patient's vaccinations are up to date.      Review of Systems   Constitutional: Negative.    HENT: Negative.  Negative for dental problem and hearing loss.    Eyes: Negative.  Negative for visual disturbance.   Respiratory: Negative.  Negative for chest tightness and shortness of breath.    Cardiovascular: Negative.  Negative for chest pain.   Gastrointestinal: Negative.  Negative for diarrhea, nausea and vomiting.   Endocrine: Negative.    Genitourinary: Negative.    Musculoskeletal: Negative.    Skin: Negative.    Allergic/Immunologic: Negative.    Neurological: Negative.    Hematological:  "Negative.    Psychiatric/Behavioral: Negative.         Objective   /70 (BP Location: Left arm, Patient Position: Sitting, BP Cuff Size: Large adult)   Pulse 79   Temp 36.4 °C (97.5 °F) (Temporal)   Ht 1.638 m (5' 4.5\")   Wt 89.3 kg (196 lb 12.8 oz)   SpO2 96%   BMI 33.26 kg/m²     Physical Exam  Constitutional:       Appearance: Normal appearance.   HENT:      Head: Normocephalic and atraumatic.      Nose: Nose normal.   Eyes:      Extraocular Movements: Extraocular movements intact.      Conjunctiva/sclera: Conjunctivae normal.      Pupils: Pupils are equal, round, and reactive to light.   Cardiovascular:      Rate and Rhythm: Normal rate and regular rhythm.      Pulses: Normal pulses.      Heart sounds: Normal heart sounds.   Pulmonary:      Effort: Pulmonary effort is normal.      Breath sounds: Normal breath sounds.   Abdominal:      General: Bowel sounds are normal.      Palpations: Abdomen is soft.   Genitourinary:     General: Normal vulva.      Rectum: Normal.   Musculoskeletal:         General: Normal range of motion.      Cervical back: Normal range of motion and neck supple.   Skin:     General: Skin is warm.   Neurological:      Mental Status: She is alert and oriented to person, place, and time.   Psychiatric:         Mood and Affect: Mood normal.         Behavior: Behavior normal.         Thought Content: Thought content normal.         Judgment: Judgment normal.         Assessment/Plan   Problem List Items Addressed This Visit             ICD-10-CM    Hypertension I10    Relevant Orders    Follow Up In [facility] - PCP - Established    CBC and Auto Differential    Allergic rhinitis J30.9    Relevant Medications    azelastine (Astelin) 137 mcg (0.1 %) nasal spray    Osteoarthritis of knee M17.9    Elevated blood sugar level R73.9    Relevant Orders    Hemoglobin A1C    Hypothyroidism E03.9    Relevant Orders    TSH with reflex to Free T4 if abnormal    Vitamin D deficiency E55.9 "    Relevant Orders    Vitamin D 25-Hydroxy,Total (for eval of Vitamin D levels)    Medicare annual wellness visit, subsequent - Primary Z00.00    Class 1 obesity due to excess calories with serious comorbidity and body mass index (BMI) of 33.0 to 33.9 in adult E66.09, Z68.33     Other Visit Diagnoses         Codes    Hyperlipidemia, unspecified hyperlipidemia type     E78.5    Relevant Orders    Comprehensive Metabolic Panel    Lipid Panel    Post-menopause     Z78.0    Breast cancer screening by mammogram     Z12.31               1. Medicare annual wellness visit, subsequent        2. Class 1 obesity due to excess calories with serious comorbidity and body mass index (BMI) of 33.0 to 33.9 in adult        3. Elevated blood sugar level  Hemoglobin A1C    CANCELED: Hemoglobin A1C      4. Hyperlipidemia, unspecified hyperlipidemia type  Comprehensive Metabolic Panel    Lipid Panel    CANCELED: Lipid Panel    CANCELED: Comprehensive Metabolic Panel      5. Hypothyroidism, unspecified type  TSH with reflex to Free T4 if abnormal    CANCELED: TSH with reflex to Free T4 if abnormal      6. Hypertension, unspecified type  Follow Up In [facility] - PCP - Established    CBC and Auto Differential    CANCELED: CBC and Auto Differential      7. Primary osteoarthritis of both knees        8. Vitamin D deficiency  Vitamin D 25-Hydroxy,Total (for eval of Vitamin D levels)    CANCELED: Vitamin D 25-Hydroxy,Total (for eval of Vitamin D levels)      9. Post-menopause        10. Breast cancer screening by mammogram        11. Wellness examination  Follow Up In [facility] - PCP - Health Maintenance      12. Allergic rhinitis due to other allergic trigger, unspecified seasonality  azelastine (Astelin) 137 mcg (0.1 %) nasal spray           1.  Medicare wellness visit    Today in the office you had your annual Medicare wellness visit    You are up-to-date with your colonoscopy for colon cancer screening.    You are  up-to-date with your mammogram for breast cancer screening.    Please check with your pharmacist when you get your flu shot in October it would be reasonable to also get a tetanus shot you could wait 2 to 4 weeks after your flu shot.    You are up-to-date with your shingles vaccines and your pneumonia vaccines    Keep eating a heart healthy diet you have done an outstanding job with weight loss congratulations.  A good goal for your diet 5-7 servings of fresh fruit vegetable every day along with lean protein avoid the simple sugars avoid the fast foods    Keep being active 30 minutes of activity every day is ideal certainly you would call if you have chest pains with your activities    Continue using the Xyzal and the Astelin nasal spray for your allergic rhinitis and allergy symptoms    Continue on hydrochlorothiazide 25 mg a day to help lower your blood pressure which is reducing your risk for heart attack and stroke.  Your recent blood pressure is normal.  If you continue to have significant weight loss and you could experience lightheadedness when you go from a sitting to a standing position if this happens check the blood pressure call me we may need to reduce or eliminate the hydrochlorothiazide    Continue on your thyroid medication    Continue on the Zepbound continue working with our pharmacist in regards to increasing the dosing.  After you have been on the higher dose after about a month please repeat your labs.  The labs from June are normal    Continue on the meloxicam to help with arthritis pain    Continue on the omeprazole to prevent acid indigestion    If you otherwise stay healthy I will see you in 6 months we will contact you with results of labs you will contact me if anything changes    Follow-up in 6 months or sooner if there are any concerns.     Scribe Attestation  By signing my name below, Funmi MCNEIL Scribe   attest that this documentation has been prepared under the direction and in the  presence of Gerardo Clark MD.    This note has been transcribed using a medical scribe and there is a possibility of unintentional typing misprints.

## 2024-08-27 PROBLEM — R73.9 ELEVATED BLOOD SUGAR LEVEL: Status: ACTIVE | Noted: 2023-08-28

## 2024-08-27 PROBLEM — E66.811 CLASS 1 OBESITY DUE TO EXCESS CALORIES WITH SERIOUS COMORBIDITY AND BODY MASS INDEX (BMI) OF 33.0 TO 33.9 IN ADULT: Status: ACTIVE | Noted: 2024-08-27

## 2024-08-27 PROBLEM — E66.09 CLASS 1 OBESITY DUE TO EXCESS CALORIES WITH SERIOUS COMORBIDITY AND BODY MASS INDEX (BMI) OF 33.0 TO 33.9 IN ADULT: Status: ACTIVE | Noted: 2024-08-27

## 2024-09-10 ENCOUNTER — APPOINTMENT (OUTPATIENT)
Dept: PHARMACY | Facility: HOSPITAL | Age: 69
End: 2024-09-10
Payer: MEDICARE

## 2024-09-10 DIAGNOSIS — E66.09 CLASS 1 OBESITY DUE TO EXCESS CALORIES WITH SERIOUS COMORBIDITY AND BODY MASS INDEX (BMI) OF 33.0 TO 33.9 IN ADULT: Primary | ICD-10-CM

## 2024-09-10 DIAGNOSIS — E66.01 OBESITY, MORBID (MULTI): ICD-10-CM

## 2024-09-10 PROCEDURE — RXMED WILLOW AMBULATORY MEDICATION CHARGE

## 2024-09-10 NOTE — ASSESSMENT & PLAN NOTE
Patient was referred for titration of Zepbound for weight loss.  Since started Zepbound the patient reports no side effects. She has lost 28 lbs total.  Discussed increasing dose and patient is agreeable.     PLAN:  - INCREASE to  Zepbound 10 mg once weekly injection Prescription sent to [facility] pharmacy to be mailed to patient.

## 2024-09-10 NOTE — PROGRESS NOTES
Subjective   Patient ID: Kavya Merchant is a 69 y.o. female who presents for Weight Loss.    Referring Provider: Gerardo Clark MD     [facility] significant for hypothyroidism and arthritis. Has participated in Weight [employer] for several years. She keeps active with gardening and yardwork over 2 acres of property. She takes Zepbound on Saturdays and started on 3/2/24. Appetite has decreased and no side effects reported so far. She has lost 25 lbs since 3/2/24.    Starting Weight: 219 lbs   Current Weight: 188 lbs     Review of Systems    Medication System Management:  Affordability/Accessibility: No issues  Adherence/Organization: No issues  Adverse Effects: None    Objective     There were no vitals taken for this visit.     Labs  Lab Results   Component Value Date    BILITOT 0.7 06/25/2024    CALCIUM 9.7 06/25/2024    CO2 28 06/25/2024     06/25/2024    CREATININE 1.06 (H) 06/25/2024    GLUCOSE 95 06/25/2024    ALKPHOS 94 06/25/2024    K 3.9 06/25/2024    PROT 6.9 06/25/2024     06/25/2024    AST 23 06/25/2024    ALT 14 06/25/2024    BUN 32 (H) 06/25/2024    ANIONGAP 13 06/25/2024    MG 1.91 08/21/2023    ALBUMIN 4.3 06/25/2024    GFRF 66 08/21/2023     Lab Results   Component Value Date    TRIG 104 06/25/2024    CHOL 107 06/25/2024    LDLCALC 28 06/25/2024    HDL 57.9 06/25/2024     Lab Results   Component Value Date    HGBA1C 5.1 06/25/2024       Current Outpatient Medications on File Prior to Visit   Medication Sig Dispense Refill    azelastine (Astelin) 137 mcg (0.1 %) nasal spray Administer 2 sprays into each nostril 2 times a day. 90 mL 3    betamethasone dipropionate (Diprolene) 0.05 % ointment Apply topically once daily.      black cohosh 540 mg capsule Take by mouth.      cholecalciferol (Vitamin D-3) 50 mcg (2,000 unit) capsule Take 5 capsules (250 mcg) by mouth early in the morning..      hydroCHLOROthiazide (HYDRODiuril) 25 mg tablet Take 1 tablet (25 mg) by mouth once daily. 90 tablet 3     levocetirizine (Xyzal) 5 mg tablet Take 1 tablet (5 mg) by mouth once daily in the evening. 90 tablet 3    levothyroxine (Synthroid) 100 mcg tablet Take 1 tablet (100 mcg) by mouth once daily. 90 tablet 3    meloxicam (Mobic) 7.5 mg tablet Take 1 tablet (7.5 mg) by mouth once daily. 90 tablet 3    metroNIDAZOLE (MetrogeL) 1 % gel Apply topically once daily.      omeprazole (PriLOSEC) 20 mg DR capsule TAKE 1 CAPSULE DAILY EVERY MORNING BEFORE BREAKFAST 90 capsule 3    [DISCONTINUED] tirzepatide, weight loss, (Zepbound) 7.5 mg/0.5 mL injection Inject 7.5 mg under the skin every 7 days. 2 mL 0     No current facility-administered medications on file prior to visit.        Assessment/Plan   Problem List Items Addressed This Visit             ICD-10-CM       Endocrine/Metabolic    Class 1 obesity due to excess calories with serious comorbidity and body mass index (BMI) of 33.0 to 33.9 in adult - Primary E66.09, Z68.33     Patient was referred for titration of Zepbound for weight loss.  Since started Zepbound the patient reports no side effects. She has lost 28 lbs total.  Discussed increasing dose and patient is agreeable.     PLAN:  - INCREASE to  Zepbound 10 mg once weekly injection Prescription sent to [facility] pharmacy to be mailed to patient.                Follow up: 4 weeks    Willa Jimenez  PGY-1 Pharmacy Resident     Continue all meds under the continuation of care with the referring provider and clinical pharmacy team.    Verbal consent to manage patient's drug therapy was obtained from the patient. They were informed they may decline to participate or withdraw from participation in pharmacy services at any time.

## 2024-09-11 ENCOUNTER — PHARMACY VISIT (OUTPATIENT)
Dept: PHARMACY | Facility: CLINIC | Age: 69
End: 2024-09-11
Payer: MEDICARE

## 2024-10-08 ENCOUNTER — APPOINTMENT (OUTPATIENT)
Dept: PHARMACY | Facility: HOSPITAL | Age: 69
End: 2024-10-08
Payer: MEDICARE

## 2024-10-08 DIAGNOSIS — E66.811 CLASS 1 OBESITY DUE TO EXCESS CALORIES WITH SERIOUS COMORBIDITY AND BODY MASS INDEX (BMI) OF 33.0 TO 33.9 IN ADULT: ICD-10-CM

## 2024-10-08 DIAGNOSIS — E66.09 CLASS 1 OBESITY DUE TO EXCESS CALORIES WITH SERIOUS COMORBIDITY AND BODY MASS INDEX (BMI) OF 33.0 TO 33.9 IN ADULT: ICD-10-CM

## 2024-10-08 PROCEDURE — RXMED WILLOW AMBULATORY MEDICATION CHARGE

## 2024-10-08 NOTE — ASSESSMENT & PLAN NOTE
Patient was referred for titration of Zepbound for weight loss.  Since last visit she started the increased dose of Zepbound and reports no side effects. She has lost 30 lbs total. The patient has noticed that her clothes are fitting better and she is continuing to lose inches. Decision was made to continue current dose for 1 more month.        PLAN:  - Continue  Zepbound 10 mg once weekly injection Prescription sent to [facility] pharmacy to be mailed to patient.

## 2024-10-08 NOTE — PROGRESS NOTES
Subjective   Patient ID: Kavya Merchant is a 69 y.o. female who presents for Weight Loss.    Referring Provider: Gerardo Clark MD     [facility] significant for hypothyroidism and arthritis. Has participated in Weight Wat[employer] for several years. She keeps active with gardening and yardwork over 2 acres of property. She takes Zepbound on Saturdays and started on 3/2/24. Appetite has decreased and no side effects reported so far. She has lost 25 lbs since 3/2/24.    Starting Weight: 219 lbs   Current Weight: 188 lbs     Review of Systems    Medication System Management:  Affordability/Accessibility: No issues  Adherence/Organization: No issues  Adverse Effects: None    Objective     There were no vitals taken for this visit.     Labs  Lab Results   Component Value Date    BILITOT 0.7 06/25/2024    CALCIUM 9.7 06/25/2024    CO2 28 06/25/2024     06/25/2024    CREATININE 1.06 (H) 06/25/2024    GLUCOSE 95 06/25/2024    ALKPHOS 94 06/25/2024    K 3.9 06/25/2024    PROT 6.9 06/25/2024     06/25/2024    AST 23 06/25/2024    ALT 14 06/25/2024    BUN 32 (H) 06/25/2024    ANIONGAP 13 06/25/2024    MG 1.91 08/21/2023    ALBUMIN 4.3 06/25/2024    GFRF 66 08/21/2023     Lab Results   Component Value Date    TRIG 104 06/25/2024    CHOL 107 06/25/2024    LDLCALC 28 06/25/2024    HDL 57.9 06/25/2024     Lab Results   Component Value Date    HGBA1C 5.1 06/25/2024       Current Outpatient Medications on File Prior to Visit   Medication Sig Dispense Refill    azelastine (Astelin) 137 mcg (0.1 %) nasal spray Administer 2 sprays into each nostril 2 times a day. 90 mL 3    betamethasone dipropionate (Diprolene) 0.05 % ointment Apply topically once daily.      black cohosh 540 mg capsule Take by mouth.      cholecalciferol (Vitamin D-3) 50 mcg (2,000 unit) capsule Take 5 capsules (250 mcg) by mouth early in the morning..      hydroCHLOROthiazide (HYDRODiuril) 25 mg tablet Take 1 tablet (25 mg) by mouth once daily. 90 tablet 3     levocetirizine (Xyzal) 5 mg tablet Take 1 tablet (5 mg) by mouth once daily in the evening. 90 tablet 3    levothyroxine (Synthroid) 100 mcg tablet Take 1 tablet (100 mcg) by mouth once daily. 90 tablet 3    meloxicam (Mobic) 7.5 mg tablet Take 1 tablet (7.5 mg) by mouth once daily. 90 tablet 3    metroNIDAZOLE (MetrogeL) 1 % gel Apply topically once daily.      omeprazole (PriLOSEC) 20 mg DR capsule TAKE 1 CAPSULE DAILY EVERY MORNING BEFORE BREAKFAST 90 capsule 3    [DISCONTINUED] tirzepatide, weight loss, (Zepbound) 10 mg/0.5 mL injection Inject 10 mg under the skin every 7 days. 2 mL 0     No current facility-administered medications on file prior to visit.        Assessment/Plan   Problem List Items Addressed This Visit             ICD-10-CM    Class 1 obesity due to excess calories with serious comorbidity and body mass index (BMI) of 33.0 to 33.9 in adult E66.811, E66.09, Z68.33     Patient was referred for titration of Zepbound for weight loss.  Since last visit she started the increased dose of Zepbound and reports no side effects. She has lost 30 lbs total. The patient has noticed that her clothes are fitting better and she is continuing to lose inches. Decision was made to continue current dose for 1 more month.        PLAN:  - Continue  Zepbound 10 mg once weekly injection Prescription sent to [facility] pharmacy to be mailed to patient.             Relevant Medications    tirzepatide, weight loss, (Zepbound) 10 mg/0.5 mL injection    Other Relevant Orders    Referral to Clinical Pharmacy     Follow up: 4 weeks    Snow Wooten PharmD     Continue all meds under the continuation of care with the referring provider and clinical pharmacy team.    Verbal consent to manage patient's drug therapy was obtained from the patient. They were informed they may decline to participate or withdraw from participation in pharmacy services at any time.

## 2024-10-11 ENCOUNTER — PHARMACY VISIT (OUTPATIENT)
Dept: PHARMACY | Facility: CLINIC | Age: 69
End: 2024-10-11
Payer: MEDICARE

## 2024-10-30 ENCOUNTER — LAB (OUTPATIENT)
Dept: LAB | Facility: LAB | Age: 69
End: 2024-10-30
Payer: MEDICARE

## 2024-10-30 DIAGNOSIS — E78.5 HYPERLIPIDEMIA, UNSPECIFIED HYPERLIPIDEMIA TYPE: ICD-10-CM

## 2024-10-30 DIAGNOSIS — E55.9 VITAMIN D DEFICIENCY: ICD-10-CM

## 2024-10-30 DIAGNOSIS — I10 HYPERTENSION, UNSPECIFIED TYPE: ICD-10-CM

## 2024-10-30 DIAGNOSIS — R73.9 ELEVATED BLOOD SUGAR LEVEL: ICD-10-CM

## 2024-10-30 DIAGNOSIS — E03.9 HYPOTHYROIDISM, UNSPECIFIED TYPE: ICD-10-CM

## 2024-10-30 LAB
25(OH)D3 SERPL-MCNC: 44 NG/ML (ref 30–100)
ALBUMIN SERPL BCP-MCNC: 4.4 G/DL (ref 3.4–5)
ALP SERPL-CCNC: 78 U/L (ref 33–136)
ALT SERPL W P-5'-P-CCNC: 15 U/L (ref 7–45)
ANION GAP SERPL CALC-SCNC: 14 MMOL/L (ref 10–20)
AST SERPL W P-5'-P-CCNC: 22 U/L (ref 9–39)
BASOPHILS # BLD AUTO: 0.05 X10*3/UL (ref 0–0.1)
BASOPHILS NFR BLD AUTO: 0.9 %
BILIRUB SERPL-MCNC: 0.8 MG/DL (ref 0–1.2)
BUN SERPL-MCNC: 20 MG/DL (ref 6–23)
CALCIUM SERPL-MCNC: 9.8 MG/DL (ref 8.6–10.6)
CHLORIDE SERPL-SCNC: 102 MMOL/L (ref 98–107)
CHOLEST SERPL-MCNC: 125 MG/DL (ref 0–199)
CHOLESTEROL/HDL RATIO: 2.2
CO2 SERPL-SCNC: 29 MMOL/L (ref 21–32)
CREAT SERPL-MCNC: 1.11 MG/DL (ref 0.5–1.05)
EGFRCR SERPLBLD CKD-EPI 2021: 54 ML/MIN/1.73M*2
EOSINOPHIL # BLD AUTO: 0.22 X10*3/UL (ref 0–0.7)
EOSINOPHIL NFR BLD AUTO: 3.7 %
ERYTHROCYTE [DISTWIDTH] IN BLOOD BY AUTOMATED COUNT: 12.7 % (ref 11.5–14.5)
EST. AVERAGE GLUCOSE BLD GHB EST-MCNC: 94 MG/DL
GLUCOSE SERPL-MCNC: 93 MG/DL (ref 74–99)
HBA1C MFR BLD: 4.9 %
HCT VFR BLD AUTO: 43.1 % (ref 36–46)
HDLC SERPL-MCNC: 57.2 MG/DL
HGB BLD-MCNC: 14.1 G/DL (ref 12–16)
IMM GRANULOCYTES # BLD AUTO: 0.02 X10*3/UL (ref 0–0.7)
IMM GRANULOCYTES NFR BLD AUTO: 0.3 % (ref 0–0.9)
LDLC SERPL CALC-MCNC: 49 MG/DL
LYMPHOCYTES # BLD AUTO: 2 X10*3/UL (ref 1.2–4.8)
LYMPHOCYTES NFR BLD AUTO: 34.1 %
MCH RBC QN AUTO: 29.9 PG (ref 26–34)
MCHC RBC AUTO-ENTMCNC: 32.7 G/DL (ref 32–36)
MCV RBC AUTO: 92 FL (ref 80–100)
MONOCYTES # BLD AUTO: 0.45 X10*3/UL (ref 0.1–1)
MONOCYTES NFR BLD AUTO: 7.7 %
NEUTROPHILS # BLD AUTO: 3.13 X10*3/UL (ref 1.2–7.7)
NEUTROPHILS NFR BLD AUTO: 53.3 %
NON HDL CHOLESTEROL: 68 MG/DL (ref 0–149)
NRBC BLD-RTO: 0 /100 WBCS (ref 0–0)
PLATELET # BLD AUTO: 237 X10*3/UL (ref 150–450)
POTASSIUM SERPL-SCNC: 3.7 MMOL/L (ref 3.5–5.3)
PROT SERPL-MCNC: 6.9 G/DL (ref 6.4–8.2)
RBC # BLD AUTO: 4.71 X10*6/UL (ref 4–5.2)
SODIUM SERPL-SCNC: 141 MMOL/L (ref 136–145)
T4 FREE SERPL-MCNC: 1.61 NG/DL (ref 0.78–1.48)
TRIGL SERPL-MCNC: 95 MG/DL (ref 0–149)
TSH SERPL-ACNC: 0.16 MIU/L (ref 0.44–3.98)
VLDL: 19 MG/DL (ref 0–40)
WBC # BLD AUTO: 5.9 X10*3/UL (ref 4.4–11.3)

## 2024-10-30 PROCEDURE — 84443 ASSAY THYROID STIM HORMONE: CPT

## 2024-10-30 PROCEDURE — 83036 HEMOGLOBIN GLYCOSYLATED A1C: CPT

## 2024-10-30 PROCEDURE — 85025 COMPLETE CBC W/AUTO DIFF WBC: CPT

## 2024-10-30 PROCEDURE — 82306 VITAMIN D 25 HYDROXY: CPT

## 2024-10-30 PROCEDURE — 84439 ASSAY OF FREE THYROXINE: CPT

## 2024-10-30 PROCEDURE — 80061 LIPID PANEL: CPT

## 2024-10-30 PROCEDURE — 36415 COLL VENOUS BLD VENIPUNCTURE: CPT

## 2024-10-30 PROCEDURE — 80053 COMPREHEN METABOLIC PANEL: CPT

## 2024-11-05 ENCOUNTER — APPOINTMENT (OUTPATIENT)
Dept: PHARMACY | Facility: HOSPITAL | Age: 69
End: 2024-11-05
Payer: MEDICARE

## 2024-11-05 DIAGNOSIS — E66.09 CLASS 1 OBESITY DUE TO EXCESS CALORIES WITH SERIOUS COMORBIDITY AND BODY MASS INDEX (BMI) OF 33.0 TO 33.9 IN ADULT: ICD-10-CM

## 2024-11-05 DIAGNOSIS — E03.9 ACQUIRED HYPOTHYROIDISM: Primary | ICD-10-CM

## 2024-11-05 DIAGNOSIS — E66.811 CLASS 1 OBESITY DUE TO EXCESS CALORIES WITH SERIOUS COMORBIDITY AND BODY MASS INDEX (BMI) OF 33.0 TO 33.9 IN ADULT: ICD-10-CM

## 2024-11-05 DIAGNOSIS — E03.9 HYPOTHYROIDISM, UNSPECIFIED TYPE: ICD-10-CM

## 2024-11-05 RX ORDER — LEVOTHYROXINE SODIUM 88 UG/1
88 TABLET ORAL DAILY
Qty: 30 TABLET | Refills: 11 | Status: SHIPPED | OUTPATIENT
Start: 2024-11-05 | End: 2025-11-05

## 2024-11-07 PROCEDURE — RXMED WILLOW AMBULATORY MEDICATION CHARGE

## 2024-11-08 NOTE — ASSESSMENT & PLAN NOTE
Patient was referred for titration of Zepbound for weight loss.  Since last visit she started the increased dose of Zepbound and reports no side effects. She has lost about 30 lbs total. The patient has noticed that her clothes are fitting better, however she has only lost about 2 lbs over the last month.  Discussed increasing the dose and patient was agreeable.       PLAN:  - INCREASE to Zepbound 12.5 mg once weekly injection Prescription sent to [facility] pharmacy to be mailed to patient.

## 2024-11-08 NOTE — PROGRESS NOTES
Subjective   Patient ID: Kavya Merchant is a 69 y.o. female who presents for Weight Loss.    Referring Provider: Gerardo Clark MD     [facility] significant for hypothyroidism and arthritis. Has participated in Weight [employer] for several years. She keeps active with gardening and yardwork over 2 acres of property. She takes Zepbound on Saturdays and started on 3/2/24. Appetite has decreased and no side effects reported so far. She has lost 25 lbs since 3/2/24.    Starting Weight: 219 lbs   Current Weight: 186 lbs     Review of Systems    Medication System Management:  Affordability/Accessibility: No issues  Adherence/Organization: No issues  Adverse Effects: None    Objective     There were no vitals taken for this visit.     Labs  Lab Results   Component Value Date    BILITOT 0.8 10/30/2024    CALCIUM 9.8 10/30/2024    CO2 29 10/30/2024     10/30/2024    CREATININE 1.11 (H) 10/30/2024    GLUCOSE 93 10/30/2024    ALKPHOS 78 10/30/2024    K 3.7 10/30/2024    PROT 6.9 10/30/2024     10/30/2024    AST 22 10/30/2024    ALT 15 10/30/2024    BUN 20 10/30/2024    ANIONGAP 14 10/30/2024    MG 1.91 08/21/2023    ALBUMIN 4.4 10/30/2024    GFRF 66 08/21/2023     Lab Results   Component Value Date    TRIG 95 10/30/2024    CHOL 125 10/30/2024    LDLCALC 49 10/30/2024    HDL 57.2 10/30/2024     Lab Results   Component Value Date    HGBA1C 4.9 10/30/2024       Current Outpatient Medications on File Prior to Visit   Medication Sig Dispense Refill    azelastine (Astelin) 137 mcg (0.1 %) nasal spray Administer 2 sprays into each nostril 2 times a day. 90 mL 3    betamethasone dipropionate (Diprolene) 0.05 % ointment Apply topically once daily.      black cohosh 540 mg capsule Take by mouth.      cholecalciferol (Vitamin D-3) 50 mcg (2,000 unit) capsule Take 5 capsules (250 mcg) by mouth early in the morning..      hydroCHLOROthiazide (HYDRODiuril) 25 mg tablet Take 1 tablet (25 mg) by mouth once daily. 90 tablet 3     levocetirizine (Xyzal) 5 mg tablet Take 1 tablet (5 mg) by mouth once daily in the evening. 90 tablet 3    meloxicam (Mobic) 7.5 mg tablet Take 1 tablet (7.5 mg) by mouth once daily. 90 tablet 3    metroNIDAZOLE (MetrogeL) 1 % gel Apply topically once daily.      omeprazole (PriLOSEC) 20 mg DR capsule TAKE 1 CAPSULE DAILY EVERY MORNING BEFORE BREAKFAST 90 capsule 3    [DISCONTINUED] levothyroxine (Synthroid) 100 mcg tablet Take 1 tablet (100 mcg) by mouth once daily. 90 tablet 3    [DISCONTINUED] tirzepatide, weight loss, (Zepbound) 10 mg/0.5 mL injection Inject 10 mg under the skin every 7 days. 2 mL 0     No current facility-administered medications on file prior to visit.        Assessment/Plan   Problem List Items Addressed This Visit             ICD-10-CM    Class 1 obesity due to excess calories with serious comorbidity and body mass index (BMI) of 33.0 to 33.9 in adult E66.811, E66.09, Z68.33     Patient was referred for titration of Zepbound for weight loss.  Since last visit she started the increased dose of Zepbound and reports no side effects. She has lost about 30 lbs total. The patient has noticed that her clothes are fitting better, however she has only lost about 2 lbs over the last month.  Discussed increasing the dose and patient was agreeable.       PLAN:  - INCREASE to Zepbound 12.5 mg once weekly injection [facility]             Relevant Medications    tirzepatide, weight loss, (Zepbound) 12.5 mg/0.5 mL injection    Other Relevant Orders    Referral to Clinical Pharmacy     Follow up: 4 weeks    Snow Wooten, JamesonD     Continue all meds under the continuation of care with the referring provider and clinical pharmacy team.    Verbal consent to manage patient's drug therapy was obtained from the patient. They were informed they may decline to participate or withdraw from participation in pharmacy services at any time.

## 2024-11-11 ENCOUNTER — PHARMACY VISIT (OUTPATIENT)
Dept: PHARMACY | Facility: CLINIC | Age: 69
End: 2024-11-11
Payer: MEDICARE

## 2024-12-03 ENCOUNTER — APPOINTMENT (OUTPATIENT)
Dept: PHARMACY | Facility: HOSPITAL | Age: 69
End: 2024-12-03
Payer: MEDICARE

## 2024-12-03 DIAGNOSIS — E66.09 CLASS 1 OBESITY DUE TO EXCESS CALORIES WITH SERIOUS COMORBIDITY AND BODY MASS INDEX (BMI) OF 33.0 TO 33.9 IN ADULT: ICD-10-CM

## 2024-12-03 DIAGNOSIS — E66.811 CLASS 1 OBESITY DUE TO EXCESS CALORIES WITH SERIOUS COMORBIDITY AND BODY MASS INDEX (BMI) OF 33.0 TO 33.9 IN ADULT: ICD-10-CM

## 2024-12-03 PROCEDURE — RXMED WILLOW AMBULATORY MEDICATION CHARGE

## 2024-12-03 NOTE — PROGRESS NOTES
Subjective   Patient ID: Kavya Merchant is a 69 y.o. female who presents for Weight Loss.    Referring Provider: Gerardo Clark MD     HPI  [facility] significant for hypothyroidism and arthritis. Has participated in Weight Watchers for several years. She keeps active with gardening and yardwork over 2 acres of property. She takes Zepbound on Saturdays and started on 3/2/24.     Starting Weight: 219 lbs   Current Weight: 181 lbs     Review of Systems    Medication System Management:  Affordability/Accessibility: No issues  Adherence/Organization: No issues  Adverse Effects: None    Objective     There were no vitals taken for this visit.     Labs  Lab Results   Component Value Date    BILITOT 0.8 10/30/2024    CALCIUM 9.8 10/30/2024    CO2 29 10/30/2024     10/30/2024    CREATININE 1.11 (H) 10/30/2024    GLUCOSE 93 10/30/2024    ALKPHOS 78 10/30/2024    K 3.7 10/30/2024    PROT 6.9 10/30/2024     10/30/2024    AST 22 10/30/2024    ALT 15 10/30/2024    BUN 20 10/30/2024    ANIONGAP 14 10/30/2024    MG 1.91 08/21/2023    ALBUMIN 4.4 10/30/2024    GFRF 66 08/21/2023     Lab Results   Component Value Date    TRIG 95 10/30/2024    CHOL 125 10/30/2024    LDLCALC 49 10/30/2024    HDL 57.2 10/30/2024     Lab Results   Component Value Date    HGBA1C 4.9 10/30/2024       Current Outpatient Medications on File Prior to Visit   Medication Sig Dispense Refill    azelastine (Astelin) 137 mcg (0.1 %) nasal spray Administer 2 sprays into each nostril 2 times a day. 90 mL 3    betamethasone dipropionate (Diprolene) 0.05 % ointment Apply topically once daily.      black cohosh 540 mg capsule Take by mouth.      cholecalciferol (Vitamin D-3) 50 mcg (2,000 unit) capsule Take 5 capsules (250 mcg) by mouth early in the morning..      hydroCHLOROthiazide (HYDRODiuril) 25 mg tablet Take 1 tablet (25 mg) by mouth once daily. 90 tablet 3    levocetirizine (Xyzal) 5 mg tablet Take 1 tablet (5 mg) by mouth once daily in the evening. 90  tablet 3    levothyroxine (Synthroid, Levoxyl) 88 mcg tablet Take 1 tablet (88 mcg) by mouth early in the morning.. Take on an empty stomach at the same time each day, either 30 to 60 minutes prior to breakfast 30 tablet 11    meloxicam (Mobic) 7.5 mg tablet Take 1 tablet (7.5 mg) by mouth once daily. 90 tablet 3    metroNIDAZOLE (MetrogeL) 1 % gel Apply topically once daily.      omeprazole (PriLOSEC) 20 mg DR capsule TAKE 1 CAPSULE DAILY EVERY MORNING BEFORE BREAKFAST 90 capsule 3    [DISCONTINUED] tirzepatide, weight loss, (Zepbound) 12.5 mg/0.5 mL injection Inject 12.5 mg under the skin every 7 days. 4 each 0     No current facility-administered medications on file prior to visit.        Assessment/Plan   Problem List Items Addressed This Visit             ICD-10-CM    Class 1 obesity due to excess calories with serious comorbidity and body mass index (BMI) of 33.0 to 33.9 in adult E66.811, E66.09, Z68.33     Patient was referred for titration of Zepbound for weight loss.  Since last visit she started the increased dose of Zepbound and reports no side effects. She has lost about 40 lbs total. The patient has noticed that her clothes are fitting better.  Discussed increasing the dose however patient would like to remain on current dose for 1 more month since she is now losing weight.      PLAN:  - Continue Zepbound 12.5 mg once weekly injection Prescription sent to [facility] pharmacy to be mailed to patient.             Relevant Medications    tirzepatide, weight loss, (Zepbound) 12.5 mg/0.5 mL injection    Other Relevant Orders    Referral to Clinical Pharmacy     Follow up: 4 weeks    Snow Wooten PharmD     Continue all meds under the continuation of care with the referring provider and clinical pharmacy team.    Verbal consent to manage patient's drug therapy was obtained from the patient. They were informed they may decline to participate or withdraw from participation in pharmacy services at any  time.

## 2024-12-03 NOTE — ASSESSMENT & PLAN NOTE
Patient was referred for titration of Zepbound for weight loss.  Since last visit she started the increased dose of Zepbound and reports no side effects. She has lost about 40 lbs total. The patient has noticed that her clothes are fitting better.  Discussed increasing the dose however patient would like to remain on current dose for 1 more month since she is now losing weight.      PLAN:  - Continue Zepbound 12.5 mg once weekly injection Prescription sent to [facility] pharmacy to be mailed to patient.

## 2024-12-05 ENCOUNTER — PHARMACY VISIT (OUTPATIENT)
Dept: PHARMACY | Facility: CLINIC | Age: 69
End: 2024-12-05
Payer: MEDICARE

## 2024-12-19 ENCOUNTER — LAB (OUTPATIENT)
Dept: LAB | Facility: LAB | Age: 69
End: 2024-12-19
Payer: MEDICARE

## 2024-12-19 DIAGNOSIS — E03.9 HYPOTHYROIDISM, UNSPECIFIED TYPE: ICD-10-CM

## 2024-12-19 PROCEDURE — 36415 COLL VENOUS BLD VENIPUNCTURE: CPT

## 2024-12-19 PROCEDURE — 84443 ASSAY THYROID STIM HORMONE: CPT

## 2024-12-19 PROCEDURE — 84439 ASSAY OF FREE THYROXINE: CPT

## 2024-12-20 DIAGNOSIS — E03.9 HYPOTHYROIDISM, UNSPECIFIED TYPE: ICD-10-CM

## 2024-12-20 DIAGNOSIS — E03.9 ACQUIRED HYPOTHYROIDISM: Primary | ICD-10-CM

## 2024-12-20 LAB
T4 FREE SERPL-MCNC: 1.69 NG/DL (ref 0.78–1.48)
TSH SERPL-ACNC: 0.31 MIU/L (ref 0.44–3.98)

## 2024-12-20 RX ORDER — LEVOTHYROXINE SODIUM 75 UG/1
75 TABLET ORAL DAILY
Qty: 30 TABLET | Refills: 11 | Status: SHIPPED | OUTPATIENT
Start: 2024-12-20 | End: 2025-12-20

## 2024-12-20 NOTE — RESULT ENCOUNTER NOTE
Call the patient and tell her recent labs indicate that she continues to take too much thyroid medication.  I am recommending she discontinue the 88 mcg a day and start on a 75 mcg a day thyroid medication.  I have sent that to the pharmacy.  She should repeat her labs in 6 weeks I will place the order.

## 2024-12-31 ENCOUNTER — APPOINTMENT (OUTPATIENT)
Dept: PHARMACY | Facility: HOSPITAL | Age: 69
End: 2024-12-31
Payer: MEDICARE

## 2024-12-31 DIAGNOSIS — E66.811 CLASS 1 OBESITY DUE TO EXCESS CALORIES WITH SERIOUS COMORBIDITY AND BODY MASS INDEX (BMI) OF 33.0 TO 33.9 IN ADULT: ICD-10-CM

## 2024-12-31 DIAGNOSIS — E66.09 CLASS 1 OBESITY DUE TO EXCESS CALORIES WITH SERIOUS COMORBIDITY AND BODY MASS INDEX (BMI) OF 33.0 TO 33.9 IN ADULT: ICD-10-CM

## 2024-12-31 PROCEDURE — RXMED WILLOW AMBULATORY MEDICATION CHARGE

## 2024-12-31 NOTE — PROGRESS NOTES
Subjective   Patient ID: Kavya Merchant is a 69 y.o. female who presents for Weight Loss.    Referring Provider: Gerardo Clark MD     HPI  [facility] significant for hypothyroidism and arthritis. Has participated in Weight Watchers for several years. She keeps active with gardening and yardwork over 2 acres of property. She takes Zepbound on Saturdays and started on 3/2/24.     Starting Weight: 219 lbs   Current Weight: 178 lbs     Review of Systems    Medication System Management:  Affordability/Accessibility: No issues  Adherence/Organization: No issues  Adverse Effects: None    Objective     There were no vitals taken for this visit.     Labs  Lab Results   Component Value Date    BILITOT 0.8 10/30/2024    CALCIUM 9.8 10/30/2024    CO2 29 10/30/2024     10/30/2024    CREATININE 1.11 (H) 10/30/2024    GLUCOSE 93 10/30/2024    ALKPHOS 78 10/30/2024    K 3.7 10/30/2024    PROT 6.9 10/30/2024     10/30/2024    AST 22 10/30/2024    ALT 15 10/30/2024    BUN 20 10/30/2024    ANIONGAP 14 10/30/2024    MG 1.91 08/21/2023    ALBUMIN 4.4 10/30/2024    GFRF 66 08/21/2023     Lab Results   Component Value Date    TRIG 95 10/30/2024    CHOL 125 10/30/2024    LDLCALC 49 10/30/2024    HDL 57.2 10/30/2024     Lab Results   Component Value Date    HGBA1C 4.9 10/30/2024       Current Outpatient Medications on File Prior to Visit   Medication Sig Dispense Refill    azelastine (Astelin) 137 mcg (0.1 %) nasal spray Administer 2 sprays into each nostril 2 times a day. 90 mL 3    betamethasone dipropionate (Diprolene) 0.05 % ointment Apply topically once daily.      black cohosh 540 mg capsule Take by mouth.      cholecalciferol (Vitamin D-3) 50 mcg (2,000 unit) capsule Take 5 capsules (250 mcg) by mouth early in the morning..      hydroCHLOROthiazide (HYDRODiuril) 25 mg tablet Take 1 tablet (25 mg) by mouth once daily. 90 tablet 3    levocetirizine (Xyzal) 5 mg tablet Take 1 tablet (5 mg) by mouth once daily in the evening. 90  tablet 3    levothyroxine (Synthroid, Levoxyl) 75 mcg tablet Take 1 tablet (75 mcg) by mouth early in the morning.. Take on an empty stomach at the same time each day, either 30 to 60 minutes prior to breakfast 30 tablet 11    meloxicam (Mobic) 7.5 mg tablet Take 1 tablet (7.5 mg) by mouth once daily. 90 tablet 3    metroNIDAZOLE (MetrogeL) 1 % gel Apply topically once daily.      omeprazole (PriLOSEC) 20 mg DR capsule TAKE 1 CAPSULE DAILY EVERY MORNING BEFORE BREAKFAST 90 capsule 3    [DISCONTINUED] tirzepatide, weight loss, (Zepbound) 12.5 mg/0.5 mL injection Inject 12.5 mg under the skin every 7 days. 4 each 0     No current facility-administered medications on file prior to visit.        Assessment/Plan   Problem List Items Addressed This Visit             ICD-10-CM    Class 1 obesity due to excess calories with serious comorbidity and body mass index (BMI) of 33.0 to 33.9 in adult E66.811, E66.09, Z68.33     Patient was referred for titration of Zepbound for weight loss.  Since last visit the patient continued the dose of Zepbound and reports no side effects. She has lost about 45 lbs total. The patient has noticed that her clothes are fitting better.  Discussed increasing the dose however patient would like to remain on current dose since she is now losing weight.      PLAN:  - Continue Zepbound 12.5 mg once weekly injection Prescription sent to [facility] pharmacy to be mailed to patient.             Relevant Medications    tirzepatide, weight loss, (Zepbound) 12.5 mg/0.5 mL injection    Other Relevant Orders    Referral to Clinical Pharmacy     Follow up: 4 weeks    Snow Wooten PharmD     Continue all meds under the continuation of care with the referring provider and clinical pharmacy team.    Verbal consent to manage patient's drug therapy was obtained from the patient. They were informed they may decline to participate or withdraw from participation in pharmacy services at any time.

## 2024-12-31 NOTE — ASSESSMENT & PLAN NOTE
Patient was referred for titration of Zepbound for weight loss.  Since last visit the patient continued the dose of Zepbound and reports no side effects. She has lost about 45 lbs total. The patient has noticed that her clothes are fitting better.  Discussed increasing the dose however patient would like to remain on current dose since she is now losing weight.      PLAN:  - Continue Zepbound 12.5 mg once weekly injection Prescription sent to [facility] pharmacy to be mailed to patient.

## 2025-01-06 ENCOUNTER — PHARMACY VISIT (OUTPATIENT)
Dept: PHARMACY | Facility: CLINIC | Age: 70
End: 2025-01-06
Payer: MEDICARE

## 2025-01-25 DIAGNOSIS — J30.89 ALLERGIC RHINITIS DUE TO OTHER ALLERGIC TRIGGER, UNSPECIFIED SEASONALITY: ICD-10-CM

## 2025-01-25 DIAGNOSIS — I10 HYPERTENSION, UNSPECIFIED TYPE: ICD-10-CM

## 2025-01-27 RX ORDER — LEVOCETIRIZINE DIHYDROCHLORIDE 5 MG/1
TABLET, FILM COATED ORAL
Qty: 90 TABLET | Refills: 3 | Status: SHIPPED | OUTPATIENT
Start: 2025-01-27

## 2025-01-27 RX ORDER — HYDROCHLOROTHIAZIDE 25 MG/1
25 TABLET ORAL DAILY
Qty: 90 TABLET | Refills: 3 | Status: SHIPPED | OUTPATIENT
Start: 2025-01-27

## 2025-01-28 ENCOUNTER — APPOINTMENT (OUTPATIENT)
Dept: PHARMACY | Facility: HOSPITAL | Age: 70
End: 2025-01-28
Payer: MEDICARE

## 2025-01-28 DIAGNOSIS — E66.811 CLASS 1 OBESITY DUE TO EXCESS CALORIES WITH SERIOUS COMORBIDITY AND BODY MASS INDEX (BMI) OF 33.0 TO 33.9 IN ADULT: ICD-10-CM

## 2025-01-28 DIAGNOSIS — E66.09 CLASS 1 OBESITY DUE TO EXCESS CALORIES WITH SERIOUS COMORBIDITY AND BODY MASS INDEX (BMI) OF 33.0 TO 33.9 IN ADULT: ICD-10-CM

## 2025-01-28 PROCEDURE — RXMED WILLOW AMBULATORY MEDICATION CHARGE

## 2025-01-28 NOTE — PROGRESS NOTES
Subjective   Patient ID: Kavya Merchant is a 69 y.o. female who presents for Weight Loss.    Referring Provider: Gerardo Clark MD     [facility] significant for hypothyroidism and arthritis. Has participated in Weight Watchers for several years. She keeps active with gardening and yardwork over 2 acres of property. She takes Zepbound on Saturdays and started on 3/2/24.     Starting Weight: 219 lbs   Current Weight: 178 lbs     Review of Systems    Medication System Management:  Affordability/Accessibility: No issues  Adherence/Organization: No issues  Adverse Effects: None    Objective     There were no vitals taken for this visit.     Labs  Lab Results   Component Value Date    BILITOT 0.8 10/30/2024    CALCIUM 9.8 10/30/2024    CO2 29 10/30/2024     10/30/2024    CREATININE 1.11 (H) 10/30/2024    GLUCOSE 93 10/30/2024    ALKPHOS 78 10/30/2024    K 3.7 10/30/2024    PROT 6.9 10/30/2024     10/30/2024    AST 22 10/30/2024    ALT 15 10/30/2024    BUN 20 10/30/2024    ANIONGAP 14 10/30/2024    MG 1.91 08/21/2023    ALBUMIN 4.4 10/30/2024    GFRF 66 08/21/2023     Lab Results   Component Value Date    TRIG 95 10/30/2024    CHOL 125 10/30/2024    LDLCALC 49 10/30/2024    HDL 57.2 10/30/2024     Lab Results   Component Value Date    HGBA1C 4.9 10/30/2024       Current Outpatient Medications on File Prior to Visit   Medication Sig Dispense Refill    azelastine (Astelin) 137 mcg (0.1 %) nasal spray Administer 2 sprays into each nostril 2 times a day. 90 mL 3    betamethasone dipropionate (Diprolene) 0.05 % ointment Apply topically once daily.      black cohosh 540 mg capsule Take by mouth.      cholecalciferol (Vitamin D-3) 50 mcg (2,000 unit) capsule Take 5 capsules (250 mcg) by mouth early in the morning..      hydroCHLOROthiazide (HYDRODiuril) 25 mg tablet TAKE 1 TABLET ONCE DAILY 90 tablet 3    levocetirizine (Xyzal) 5 mg tablet TAKE 1 TABLET ONCE DAILY INTHE EVENING 90 tablet 3    levothyroxine (Synthroid,  Levoxyl) 75 mcg tablet Take 1 tablet (75 mcg) by mouth early in the morning.. Take on an empty stomach at the same time each day, either 30 to 60 minutes prior to breakfast 30 tablet 11    meloxicam (Mobic) 7.5 mg tablet Take 1 tablet (7.5 mg) by mouth once daily. 90 tablet 3    metroNIDAZOLE (MetrogeL) 1 % gel Apply topically once daily.      omeprazole (PriLOSEC) 20 mg DR capsule TAKE 1 CAPSULE DAILY EVERY MORNING BEFORE BREAKFAST 90 capsule 3    [DISCONTINUED] hydroCHLOROthiazide (HYDRODiuril) 25 mg tablet Take 1 tablet (25 mg) by mouth once daily. 90 tablet 3    [DISCONTINUED] levocetirizine (Xyzal) 5 mg tablet Take 1 tablet (5 mg) by mouth once daily in the evening. 90 tablet 3    [DISCONTINUED] tirzepatide, weight loss, (Zepbound) 12.5 mg/0.5 mL injection Inject 12.5 mg under the skin every 7 days. 4 each 0     No current facility-administered medications on file prior to visit.        Assessment/Plan   Problem List Items Addressed This Visit             ICD-10-CM    Class 1 obesity due to excess calories with serious comorbidity and body mass index (BMI) of 33.0 to 33.9 in adult E66.811, E66.09, Z68.33     Patient was referred for titration of Zepbound for weight loss.  Since last visit the patient continued the dose of Zepbound and reports no side effects. She has lost about 45 lbs total. The patient has noticed that her clothes are fitting better.  Discussed increasing the dose however patient would like to remain on current dose since she is still losing weight.      PLAN:  - Continue Zepbound 12.5 mg once weekly injection Prescription sent to [facility] pharmacy to be mailed to patient.             Relevant Medications    tirzepatide, weight loss, (Zepbound) 12.5 mg/0.5 mL injection    Other Relevant Orders    Referral to Clinical Pharmacy     Follow up: 3 months    Snow Wooten, JamesonD     Continue all meds under the continuation of care with the referring provider and clinical pharmacy  team.    Verbal consent to manage patient's drug therapy was obtained from the patient. They were informed they may decline to participate or withdraw from participation in pharmacy services at any time.

## 2025-01-28 NOTE — ASSESSMENT & PLAN NOTE
Patient was referred for titration of Zepbound for weight loss.  Since last visit the patient continued the dose of Zepbound and reports no side effects. She has lost about 45 lbs total. The patient has noticed that her clothes are fitting better.  Discussed increasing the dose however patient would like to remain on current dose since she is still losing weight.      PLAN:  - Continue Zepbound 12.5 mg once weekly injection Prescription sent to [facility] pharmacy to be mailed to patient.

## 2025-01-29 ENCOUNTER — PHARMACY VISIT (OUTPATIENT)
Dept: PHARMACY | Facility: CLINIC | Age: 70
End: 2025-01-29
Payer: MEDICARE

## 2025-01-31 LAB — TSH SERPL-ACNC: 2.07 MIU/L (ref 0.4–4.5)

## 2025-02-02 DIAGNOSIS — I10 PRIMARY HYPERTENSION: ICD-10-CM

## 2025-02-02 DIAGNOSIS — E03.9 HYPOTHYROIDISM, UNSPECIFIED TYPE: ICD-10-CM

## 2025-02-02 DIAGNOSIS — R73.9 ELEVATED BLOOD SUGAR LEVEL: ICD-10-CM

## 2025-02-02 DIAGNOSIS — E55.9 VITAMIN D DEFICIENCY: ICD-10-CM

## 2025-02-02 DIAGNOSIS — E78.2 MIXED HYPERLIPIDEMIA: Primary | ICD-10-CM

## 2025-02-26 LAB
25(OH)D3+25(OH)D2 SERPL-MCNC: 45 NG/ML (ref 30–100)
ALBUMIN SERPL-MCNC: 4.4 G/DL (ref 3.6–5.1)
ALP SERPL-CCNC: 86 U/L (ref 37–153)
ALT SERPL-CCNC: 13 U/L (ref 6–29)
ANION GAP SERPL CALCULATED.4IONS-SCNC: 9 MMOL/L (CALC) (ref 7–17)
AST SERPL-CCNC: 22 U/L (ref 10–35)
BASOPHILS # BLD AUTO: 49 CELLS/UL (ref 0–200)
BASOPHILS NFR BLD AUTO: 1 %
BILIRUB SERPL-MCNC: 0.7 MG/DL (ref 0.2–1.2)
BUN SERPL-MCNC: 26 MG/DL (ref 7–25)
CALCIUM SERPL-MCNC: 9.7 MG/DL (ref 8.6–10.4)
CHLORIDE SERPL-SCNC: 103 MMOL/L (ref 98–110)
CHOLEST SERPL-MCNC: 120 MG/DL
CHOLEST/HDLC SERPL: 1.9 (CALC)
CO2 SERPL-SCNC: 28 MMOL/L (ref 20–32)
CREAT SERPL-MCNC: 0.97 MG/DL (ref 0.6–1)
EGFRCR SERPLBLD CKD-EPI 2021: 63 ML/MIN/1.73M2
EOSINOPHIL # BLD AUTO: 172 CELLS/UL (ref 15–500)
EOSINOPHIL NFR BLD AUTO: 3.5 %
ERYTHROCYTE [DISTWIDTH] IN BLOOD BY AUTOMATED COUNT: 12.3 % (ref 11–15)
EST. AVERAGE GLUCOSE BLD GHB EST-MCNC: 105 MG/DL
EST. AVERAGE GLUCOSE BLD GHB EST-SCNC: 5.8 MMOL/L
GLUCOSE SERPL-MCNC: 79 MG/DL (ref 65–99)
HBA1C MFR BLD: 5.3 % OF TOTAL HGB
HCT VFR BLD AUTO: 42.9 % (ref 35–45)
HDLC SERPL-MCNC: 62 MG/DL
HGB BLD-MCNC: 14.1 G/DL (ref 11.7–15.5)
LDLC SERPL CALC-MCNC: 42 MG/DL (CALC)
LYMPHOCYTES # BLD AUTO: 1901 CELLS/UL (ref 850–3900)
LYMPHOCYTES NFR BLD AUTO: 38.8 %
MAGNESIUM SERPL-MCNC: 2 MG/DL (ref 1.5–2.5)
MCH RBC QN AUTO: 30.1 PG (ref 27–33)
MCHC RBC AUTO-ENTMCNC: 32.9 G/DL (ref 32–36)
MCV RBC AUTO: 91.7 FL (ref 80–100)
MONOCYTES # BLD AUTO: 358 CELLS/UL (ref 200–950)
MONOCYTES NFR BLD AUTO: 7.3 %
NEUTROPHILS # BLD AUTO: 2421 CELLS/UL (ref 1500–7800)
NEUTROPHILS NFR BLD AUTO: 49.4 %
NONHDLC SERPL-MCNC: 58 MG/DL (CALC)
PLATELET # BLD AUTO: 245 THOUSAND/UL (ref 140–400)
PMV BLD REES-ECKER: 11.4 FL (ref 7.5–12.5)
POTASSIUM SERPL-SCNC: 3.8 MMOL/L (ref 3.5–5.3)
PROT SERPL-MCNC: 6.8 G/DL (ref 6.1–8.1)
RBC # BLD AUTO: 4.68 MILLION/UL (ref 3.8–5.1)
SODIUM SERPL-SCNC: 140 MMOL/L (ref 135–146)
TRIGL SERPL-MCNC: 77 MG/DL
TSH SERPL-ACNC: 1.76 MIU/L (ref 0.4–4.5)
VIT B12 SERPL-MCNC: 200 PG/ML (ref 200–1100)
WBC # BLD AUTO: 4.9 THOUSAND/UL (ref 3.8–10.8)

## 2025-03-03 ENCOUNTER — APPOINTMENT (OUTPATIENT)
Dept: PRIMARY CARE | Facility: CLINIC | Age: 70
End: 2025-03-03
Payer: MEDICARE

## 2025-03-03 VITALS
WEIGHT: 183.9 LBS | DIASTOLIC BLOOD PRESSURE: 71 MMHG | BODY MASS INDEX: 31.08 KG/M2 | TEMPERATURE: 97.1 F | HEART RATE: 78 BPM | SYSTOLIC BLOOD PRESSURE: 125 MMHG | OXYGEN SATURATION: 97 %

## 2025-03-03 DIAGNOSIS — E78.5 HYPERLIPIDEMIA, UNSPECIFIED HYPERLIPIDEMIA TYPE: ICD-10-CM

## 2025-03-03 DIAGNOSIS — J30.89 ALLERGIC RHINITIS DUE TO OTHER ALLERGIC TRIGGER, UNSPECIFIED SEASONALITY: ICD-10-CM

## 2025-03-03 DIAGNOSIS — E53.8 VITAMIN B12 DEFICIENCY: ICD-10-CM

## 2025-03-03 DIAGNOSIS — Z12.11 ENCOUNTER FOR SCREENING FOR MALIGNANT NEOPLASM OF COLON: ICD-10-CM

## 2025-03-03 DIAGNOSIS — R73.01 ELEVATED FASTING BLOOD SUGAR: ICD-10-CM

## 2025-03-03 DIAGNOSIS — I10 HYPERTENSION, UNSPECIFIED TYPE: Primary | ICD-10-CM

## 2025-03-03 DIAGNOSIS — E55.9 VITAMIN D DEFICIENCY: ICD-10-CM

## 2025-03-03 DIAGNOSIS — M17.0 PRIMARY OSTEOARTHRITIS OF BOTH KNEES: ICD-10-CM

## 2025-03-03 DIAGNOSIS — R73.9 ELEVATED BLOOD SUGAR LEVEL: ICD-10-CM

## 2025-03-03 DIAGNOSIS — L98.9 SKIN LESION OF FACE: ICD-10-CM

## 2025-03-03 DIAGNOSIS — M75.81 ROTATOR CUFF TENDONITIS, RIGHT: ICD-10-CM

## 2025-03-03 DIAGNOSIS — E03.9 HYPOTHYROIDISM, UNSPECIFIED TYPE: ICD-10-CM

## 2025-03-03 DIAGNOSIS — Z09 ENCOUNTER FOR FOLLOW-UP: ICD-10-CM

## 2025-03-03 PROCEDURE — 1159F MED LIST DOCD IN RCRD: CPT | Performed by: FAMILY MEDICINE

## 2025-03-03 PROCEDURE — 3074F SYST BP LT 130 MM HG: CPT | Performed by: FAMILY MEDICINE

## 2025-03-03 PROCEDURE — 99214 OFFICE O/P EST MOD 30 MIN: CPT | Performed by: FAMILY MEDICINE

## 2025-03-03 PROCEDURE — 1157F ADVNC CARE PLAN IN RCRD: CPT | Performed by: FAMILY MEDICINE

## 2025-03-03 PROCEDURE — 1123F ACP DISCUSS/DSCN MKR DOCD: CPT | Performed by: FAMILY MEDICINE

## 2025-03-03 PROCEDURE — 3078F DIAST BP <80 MM HG: CPT | Performed by: FAMILY MEDICINE

## 2025-03-03 PROCEDURE — G2211 COMPLEX E/M VISIT ADD ON: HCPCS | Performed by: FAMILY MEDICINE

## 2025-03-03 PROCEDURE — 1160F RVW MEDS BY RX/DR IN RCRD: CPT | Performed by: FAMILY MEDICINE

## 2025-03-03 RX ORDER — ESTRADIOL 0.1 MG/G
CREAM VAGINAL
COMMUNITY
Start: 2025-01-15

## 2025-03-03 RX ORDER — LANOLIN ALCOHOL/MO/W.PET/CERES
1000 CREAM (GRAM) TOPICAL DAILY
Qty: 100 TABLET | Refills: 3 | Status: SHIPPED | OUTPATIENT
Start: 2025-03-03 | End: 2026-04-07

## 2025-03-03 NOTE — PATIENT INSTRUCTIONS
1.  Hypertension    Blood pressure is normal 25/71.    Continue on hydrochlorothiazide 25 mg a day this medicine not only helps lower blood pressure and reduces any swelling.  By lowering your blood pressure we are reducing risk for heart attack and stroke    2.  Weight loss.    You have done an outstanding job with weight loss congratulations.  Continue eating a heart healthy diet as you have been plenty of fresh fruits and fresh vegetables 5-7 servings a day if possible along with lean proteins avoiding the simple sugars avoiding the fast foods.    Continue with activity and exercise 30 minutes 5 days a week is ideal certainly would call if he had chest pains with activities    Continue on generic Zepbound 12.5 mg a week continue following up with our pharmacist as well    3.  Right shoulder pain    I am concerned you are developing rotator cuff tendinitis we will place a referral for you to meet with one of our sports medicine orthopedic specialist who will be able to offer you ultrasound-guided cortisone injection    4.  Ganglion cyst left ring finger.  Please discuss this in more detail with the sports medicine specialist as well he may offer you treatment or have you get a referral if it gets more swollen more painful we should have you meet with a hand specialist sooner rather than later.  Try to protect this area is much as possible trying not to  things where it would be hitting directly over the ganglion cyst as it will cause it to get larger    5.  Abnormal skin lesion on your nose.  I am recommending dermatology for further evaluation    6.  Continue following up with Dr. Esteves in regards to possible bacterial vaginosis/yeast infection    7.  Continue on current dose of levothyroxine 75 mcg a day recent labs indicate your TSH is in the normal range indicating you are taking the correct dose    8.  Continue on the omeprazole to prevent GERD continue on the meloxicam to help with arthritis pain    9.   Placed on pulse ox   Please start on vitamin B12 1000 mcg a day we will repeat that with your next set of labs    I will see you back in 6 months please have repeat labs prior to that appointment as well I am happy to see you sooner if needed

## 2025-03-03 NOTE — PROGRESS NOTES
Subjective   Patient ID: Kavya Merchant is a 69 y.o. female who presents for Follow-up (6mo fuv htn, thy, discuss right shoulder, has a list of things to discuss).      HPI     Hypertension: The patient is here for an evaluation of elevated blood pressure. The patient is trying to follow a low-salt diet. She is adherent to a low salt diet. Blood pressure is well controlled at home, with ranges being in not doing. The patient has not been hospitalized for this in the last 6 months. The patient is compliant with medications, which are currently hydrochlorothiazide. Patient denies any side effects to the medications. Blood pressure in office today is 125/71.    The patient is trying to stay active and healthy. They are currently exercising and remaining physically active. The aare maintaining a heathy diet that includes green leafy vegetables, fruits and proteins. They arestaying well hydrated.    The patient denies any changes in vision, hearing or dental.     The patient maintains they do not have any chest pain, chest tightness or shortness of breath.    They do not experience nausea, emesis, changes in bowel movements or dyspepsia.    The patient denies changes in or worsening of moods.    The patient's colonoscopy is not up to date.     The patient's mammogram is up to date.    The patient's vaccinations are up to date.    Review of Systems   Constitutional: Negative.    HENT: Negative.  Negative for dental problem and hearing loss.    Eyes: Negative.  Negative for visual disturbance.   Respiratory: Negative.  Negative for chest tightness and shortness of breath.    Cardiovascular: Negative.  Negative for chest pain.   Gastrointestinal: Negative.  Negative for constipation, diarrhea, nausea and vomiting.   Endocrine: Negative.    Genitourinary: Negative.    Musculoskeletal: Negative.    Skin: Negative.    Allergic/Immunologic: Negative.    Neurological: Negative.    Hematological: Negative.     Psychiatric/Behavioral: Negative.     14/14 systems reviewed and negative other than what is listed in the history of present illness     Objective   /71 (BP Location: Left arm, Patient Position: Sitting, BP Cuff Size: Large adult)   Pulse 78   Temp 36.2 °C (97.1 °F) (Temporal)   Wt 83.4 kg (183 lb 14.4 oz)   SpO2 97%   BMI 31.08 kg/m²     Physical Exam    Physical Exam  Constitutional:       Appearance: Normal appearance.   HENT:      Head: Normocephalic and atraumatic.      Nose: Nose normal.   Eyes:      Extraocular Movements: Extraocular movements intact.      Conjunctiva/sclera: Conjunctivae normal.      Pupils: Pupils are equal, round, and reactive to light.   Cardiovascular:      Rate and Rhythm: Normal rate and regular rhythm.      Pulses: Normal pulses.      Heart sounds: Normal heart sounds.   Pulmonary:      Effort: Pulmonary effort is normal.      Breath sounds: Normal breath sounds.   Abdominal:      General: Bowel sounds are normal.      Palpations: Abdomen is soft.   Musculoskeletal:      Right shoulder: Decreased range of motion.      Cervical back: Normal range of motion and neck supple.   Skin:     General: Skin is warm.   Neurological:      Mental Status: She is alert and oriented to person, place, and time.   Psychiatric:         Mood and Affect: Mood normal.         Behavior: Behavior normal.         Thought Content: Thought content normal.         Judgment: Judgment normal.         Assessment/Plan     1. Hypertension, unspecified type  CBC and Auto Differential    Follow Up In [facility] - PCP - Established      2. Hypothyroidism, unspecified type  TSH with reflex to Free T4 if abnormal      3. Primary osteoarthritis of both knees        4. Vitamin D deficiency  Vitamin D 25-Hydroxy,Total (for eval of Vitamin D levels)      5. Elevated blood sugar level  Hemoglobin A1C      6. Vitamin B12 deficiency  CBC and Auto Differential    cyanocobalamin (Vitamin B-12) 1,000 mcg  tablet    Vitamin B12    Vitamin B12      7. Encounter for follow-up  CBC and Auto Differential    Comprehensive Metabolic Panel    Lipid Panel    TSH with reflex to Free T4 if abnormal    Hemoglobin A1C    Vitamin D 25-Hydroxy,Total (for eval of Vitamin D levels)      8. Encounter for screening for malignant neoplasm of colon  Colonoscopy Screening; Average Risk Patient      9. Elevated fasting blood sugar        10. Hyperlipidemia, unspecified hyperlipidemia type  Comprehensive Metabolic Panel    Lipid Panel      11. Skin lesion of face  Referral to Dermatology      12. Rotator cuff tendonitis, right  Referral to Sports Medicine      13. Allergic rhinitis due to other allergic trigger, unspecified seasonality        14. BMI 31.0-31.9,adult          1.  Hypertension    Blood pressure is normal 25/71.    Continue on hydrochlorothiazide 25 mg a day this medicine not only helps lower blood pressure and reduces any swelling.  By lowering your blood pressure we are reducing risk for heart attack and stroke    2.  Weight loss.    You have done an outstanding job with weight loss congratulations.  Continue eating a heart healthy diet as you have been plenty of fresh fruits and fresh vegetables 5-7 servings a day if possible along with lean proteins avoiding the simple sugars avoiding the fast foods.    Continue with activity and exercise 30 minutes 5 days a week is ideal certainly would call if he had chest pains with activities    Continue on generic Zepbound 12.5 mg a week continue following up with our pharmacist as well    3.  Right shoulder pain    I am concerned you are developing rotator cuff tendinitis we will place a referral for you to meet with one of our sports medicine orthopedic specialist who will be able to offer you ultrasound-guided cortisone injection    4.  Ganglion cyst left ring finger.  Please discuss this in more detail with the sports medicine specialist as well he may offer you treatment or have  you get a referral if it gets more swollen more painful we should have you meet with a hand specialist sooner rather than later.  Try to protect this area is much as possible trying not to  things where it would be hitting directly over the ganglion cyst as it will cause it to get larger    5.  Abnormal skin lesion on your nose.  I am recommending dermatology for further evaluation    6.  Continue following up with Dr. Esteves in regards to possible bacterial vaginosis/yeast infection    7.  Continue on current dose of levothyroxine 75 mcg a day recent labs indicate your TSH is in the normal range indicating you are taking the correct dose    8.  Continue on the omeprazole to prevent GERD continue on the meloxicam to help with arthritis pain    9.  Please start on vitamin B12 1000 mcg a day we will repeat that with your next set of labs    I will see you back in 6 months please have repeat labs prior to that appointment as well I am happy to see you sooner if needed    Follow-up in 6 months or sooner if there are any concerns.    Scribe Attestation  By signing my name below, IFunmi, Any   attest that this documentation has been prepared under the direction and in the presence of Gerardo Clark MD.    This note has been transcribed using a medical scribe and there is a possibility of unintentional typing misprints.

## 2025-03-04 ENCOUNTER — OFFICE VISIT (OUTPATIENT)
Dept: ORTHOPEDIC SURGERY | Facility: CLINIC | Age: 70
End: 2025-03-04
Payer: MEDICARE

## 2025-03-04 ENCOUNTER — HOSPITAL ENCOUNTER (OUTPATIENT)
Dept: RADIOLOGY | Facility: EXTERNAL LOCATION | Age: 70
Discharge: HOME | End: 2025-03-04

## 2025-03-04 ENCOUNTER — HOSPITAL ENCOUNTER (OUTPATIENT)
Dept: RADIOLOGY | Facility: CLINIC | Age: 70
Discharge: HOME | End: 2025-03-04
Payer: MEDICARE

## 2025-03-04 VITALS — WEIGHT: 180 LBS | HEIGHT: 66 IN | BODY MASS INDEX: 28.93 KG/M2

## 2025-03-04 DIAGNOSIS — M75.41 ROTATOR CUFF IMPINGEMENT SYNDROME OF RIGHT SHOULDER: Primary | ICD-10-CM

## 2025-03-04 DIAGNOSIS — M75.81 ROTATOR CUFF TENDONITIS, RIGHT: ICD-10-CM

## 2025-03-04 DIAGNOSIS — M25.511 ACUTE PAIN OF RIGHT SHOULDER: ICD-10-CM

## 2025-03-04 PROBLEM — E66.811 CLASS 1 OBESITY DUE TO EXCESS CALORIES WITH SERIOUS COMORBIDITY AND BODY MASS INDEX (BMI) OF 33.0 TO 33.9 IN ADULT: Status: RESOLVED | Noted: 2024-08-27 | Resolved: 2025-03-04

## 2025-03-04 PROBLEM — E66.09 CLASS 1 OBESITY DUE TO EXCESS CALORIES WITH SERIOUS COMORBIDITY AND BODY MASS INDEX (BMI) OF 33.0 TO 33.9 IN ADULT: Status: RESOLVED | Noted: 2024-08-27 | Resolved: 2025-03-04

## 2025-03-04 PROBLEM — E78.5 HYPERLIPIDEMIA: Status: ACTIVE | Noted: 2025-03-04

## 2025-03-04 PROBLEM — L98.9 SKIN LESION OF FACE: Status: ACTIVE | Noted: 2025-03-04

## 2025-03-04 PROCEDURE — 1160F RVW MEDS BY RX/DR IN RCRD: CPT | Performed by: FAMILY MEDICINE

## 2025-03-04 PROCEDURE — 20611 DRAIN/INJ JOINT/BURSA W/US: CPT | Performed by: FAMILY MEDICINE

## 2025-03-04 PROCEDURE — 1159F MED LIST DOCD IN RCRD: CPT | Performed by: FAMILY MEDICINE

## 2025-03-04 PROCEDURE — 1036F TOBACCO NON-USER: CPT | Performed by: FAMILY MEDICINE

## 2025-03-04 PROCEDURE — 3008F BODY MASS INDEX DOCD: CPT | Performed by: FAMILY MEDICINE

## 2025-03-04 PROCEDURE — 1123F ACP DISCUSS/DSCN MKR DOCD: CPT | Performed by: FAMILY MEDICINE

## 2025-03-04 PROCEDURE — 99203 OFFICE O/P NEW LOW 30 MIN: CPT | Performed by: FAMILY MEDICINE

## 2025-03-04 PROCEDURE — 1157F ADVNC CARE PLAN IN RCRD: CPT | Performed by: FAMILY MEDICINE

## 2025-03-04 PROCEDURE — 73030 X-RAY EXAM OF SHOULDER: CPT | Mod: RT

## 2025-03-04 RX ORDER — TRIAMCINOLONE ACETONIDE 40 MG/ML
40 INJECTION, SUSPENSION INTRA-ARTICULAR; INTRAMUSCULAR
Status: COMPLETED | OUTPATIENT
Start: 2025-03-04 | End: 2025-03-04

## 2025-03-04 RX ORDER — LIDOCAINE HYDROCHLORIDE 20 MG/ML
2 INJECTION, SOLUTION INFILTRATION; PERINEURAL
Status: COMPLETED | OUTPATIENT
Start: 2025-03-04 | End: 2025-03-04

## 2025-03-04 RX ADMIN — LIDOCAINE HYDROCHLORIDE 2 ML: 20 INJECTION, SOLUTION INFILTRATION; PERINEURAL at 10:42

## 2025-03-04 RX ADMIN — TRIAMCINOLONE ACETONIDE 40 MG: 40 INJECTION, SUSPENSION INTRA-ARTICULAR; INTRAMUSCULAR at 10:42

## 2025-03-04 NOTE — PROGRESS NOTES
History of Present Illness   Chief Complaint   Patient presents with    Right Shoulder - Pain     Nki  +pain x couple months +lrom -sleep       The patient is 69 y.o. female  here with a complaint of right shoulder pain, referred by PCP, Gerardo Clark.  This is an acute on somewhat chronic issue.  Patient was last done with some shoulder pain around 3 years ago, atraumatic in nature, did have x-rays, MRIs and at that time, there was some partial-thickness tearing of the supraspinatus tendon with some bursitis as well as external impingement from advanced osteoarthritis of the AC joint with some inferior osteophytosis.  There is also some concern for some frozen shoulder at that time, was seeing Dr. Perez, had good response to shoulder injection as well as physical therapy and says for the most part for the past few years she has been relatively asymptomatic.  She was quite active in the  spring and fall of this year, spreading mulch, raking leaves without any significant shoulder pain.  Around [place] time says she was moving boxes when she felt some discomfort in her shoulder which has been ongoing since.  She will get a sharp pain with certain motions of the shoulder, some occasional pain sleeping on her shoulder at night, pain over the anterior lateral aspect of her shoulder, she denies any radiation of pain.  She takes over-the-counter medications as needed for pain, Dr. Clark  recommended follow-up with myself today for further evaluation.      No past medical history on file.    Medication Documentation Review Audit       Reviewed by Gerardo Clark MD (Physician) on 03/03/25 at 1103      Medication Order Taking? Sig Documenting Provider Last Dose Status   azelastine (Astelin) 137 mcg (0.1 %) nasal spray [id number] Yes Administer 2 sprays into each nostril 2 times a day. Gerardo Clark MD  Active   betamethasone dipropionate (Diprolene) 0.05 % ointment [id number] Yes Apply topically once daily. Historical  Provider, MD  Active   black cohosh 540 mg capsule [id number] Yes Take by mouth. Historical Provider, MD  Active   cholecalciferol (Vitamin D-3) 50 mcg (2,000 unit) capsule [id number] Yes Take 5 capsules (250 mcg) by mouth early in the morning.. Historical Provider, MD  Active   estradiol (Estrace) 0.01 % (0.1 mg/gram) vaginal cream [id number] Yes use 1 gram VAGINALLY Once a day 90 days Historical Provider, MD  Active   hydroCHLOROthiazide (HYDRODiuril) 25 mg tablet [id number] Yes TAKE 1 TABLET ONCE DAILY Gerardo Clark MD  Active   levocetirizine (Xyzal) 5 mg tablet [id number] Yes TAKE 1 TABLET ONCE DAILY INTHE EVENING Gerardo Calrk MD  Active   levothyroxine (Synthroid, Levoxyl) 75 mcg tablet [id number] Yes Take 1 tablet (75 mcg) by mouth early in the morning.. Take on an empty stomach at the same time each day, either 30 to 60 minutes prior to breakfast Gerardo Clark MD  Active   meloxicam (Mobic) 7.5 mg tablet [id number] Yes Take 1 tablet (7.5 mg) by mouth once daily. Gerardo Clark MD  Active   metroNIDAZOLE (MetrogeL) 1 % gel [id number] Yes Apply topically once daily. Historical Provider, MD  Active   omeprazole (PriLOSEC) 20 mg DR capsule [id number] Yes TAKE 1 CAPSULE DAILY EVERY MORNING BEFORE BREAKFAST Gerardo Clark MD  Active   tirzepatide, weight loss, (Zepbound) 12.5 mg/0.5 mL injection [id number] Yes Inject 12.5 mg under the skin every 7 days. Gerardo Clark MD  Active                    Allergies   Allergen Reactions    Codeine Nausea/vomiting       Social History     Socioeconomic History    Marital status:      Spouse name: Not on file    Number of children: Not on file    Years of education: Not on file    Highest education level: Not on file   Occupational History    Not on file   Tobacco Use    Smoking status: Never     Passive exposure: Never    Smokeless tobacco: Never   Substance and Sexual Activity    Alcohol use: Yes     Comment: occasional wine    Drug use: Never    Sexual activity: Not on file    Other Topics Concern    Not on file   Social History Narrative    Not on file     Social Drivers of Health     Financial Resource Strain: Not on file   Food Insecurity: Not on file   Transportation Needs: Not on file   Physical Activity: Not on file   Stress: Not on file   Social Connections: Not on file   Intimate Partner Violence: Not on file   Housing Stability: Not on file       Past Surgical History:   Procedure Laterality Date    APPENDECTOMY  03/25/2014    Appendectomy    BACK SURGERY  03/25/2014    Back Surgery    OTHER SURGICAL HISTORY  03/25/2014    Cystoscopy With Pyeloscopy With Removal Of Calculus    TUBAL LIGATION  03/25/2014    Tubal Ligation          Review of Systems   GENERAL: Negative  GI: Negative  MUSCULOSKELETAL: See HPI  SKIN: Negative  NEURO:  Negative     Physical Exam:    General/Constitutional: well appearing, no distress, appears stated age  HEENT: sclera clear  Respiratory: non labored breathing  Vascular: No edema, swelling or tenderness, except as noted in detailed exam.  Integumentary: No impressive skin lesions present, except as noted in detailed exam.  Neurological:  Alert and oriented   Psychological:  Normal mood and affect.  Musculoskeletal: Normal, except as noted in detailed exam and in HPI    Right shoulder: Normal appearance, no skin changes, no muscle atrophy.  She has relatively preserved range of motion equal to the left, positive painful arc with abduction, internal rotation lumbar spine.  There is no significant weakness with rotator cuff strength testing.  She does have some pain with resisted external rotation and abduction.  There is some discomfort with Culver and Neer's test.  Positive [place], negative Speed, negative Yergason.       Imaging: X-ray of right shoulder obtained today and independently reviewed, there is moderate to advanced degenerative changes of the AC joint with joint space narrowing, osteophytosis, minimal degenerative changes of the  glenohumeral joint.     Previous MRI of right shoulder from 2022 was reviewed, there was evidence of high-grade partial-thickness tear of the distal supraspinatus with moderate tendinosis, external impingement from moderate to advanced AC joint arthritis with inferior osteophytosis with associated subacromial bursitis.    L Inj/Asp: R subacromial bursa on 3/4/2025 10:42 AM  Indications: pain  Details: 22 G needle, ultrasound-guided lateral approach  Medications: 40 mg triamcinolone acetonide 40 mg/mL; 2 mL lidocaine 20 mg/mL (2 %)  Outcome: tolerated well, no immediate complications    Right shoulder subacromial bursa and surrounding structures were well-visualized with ultrasound both before and during injection    Images were permanently uploaded to patient's medical record/PACS.  Procedure, treatment alternatives, risks and benefits explained, specific risks discussed. Consent was given by the patient. Immediately prior to procedure a time out was called to verify the correct patient, procedure, equipment, support staff and site/side marked as required. Patient was prepped and draped in the usual sterile fashion.               Assessment   1. Rotator cuff impingement syndrome of right shoulder        2. Acute pain of right shoulder  XR shoulder right 2+ views    Point of Care Ultrasound      3. Rotator cuff tendonitis, right  Referral to Sports Medicine            Plan: Discussed diagnosis, reviewed x-ray as well as previous MRI findings.  We did proceed with ultrasound-guided subacromial injection with Kenalog today.  She tolerated without issue, she will monitor for improvement in symptoms over the next 1 to 2 weeks.  She can progress back to activities as tolerated.  She will plan to follow-up as symptoms dictate.

## 2025-03-04 NOTE — LETTER
March 4, 2025     Gerardo Clark MD  [address]  [facility]  [facility] [address]    Patient: Kavya Merchant   YOB: 1955   Date of Visit: 3/4/2025       Dear Dr. Gerardo Clark MD:    Thank you for referring Kavya Merchant to me for evaluation. Below are my notes for this consultation.  If you have questions, please do not hesitate to call me. I look forward to following your patient along with you.       Sincerely,     Rene Perea MD      CC: No Recipients  ______________________________________________________________________________________      History of Present Illness   Chief Complaint   Patient presents with   • Right Shoulder - Pain     Nki  +pain x couple months +lrom -sleep       The patient is 69 y.o. female  here with a complaint of right shoulder pain, referred by PCP, Gerardo Clark.  This is an acute on somewhat chronic issue.  Patient was last done with some shoulder pain around 3 years ago, atraumatic in nature, did have x-rays, MRIs and at that time, there was some partial-thickness tearing of the supraspinatus tendon with some bursitis as well as external impingement from advanced osteoarthritis of the AC joint with some inferior osteophytosis.  There is also some concern for some frozen shoulder at that time, was seeing Dr. Perez, had good response to shoulder injection as well as physical therapy and says for the most part for the past few years she has been relatively asymptomatic.  She was quite active in the  spring and fall of this year, spreading mulch, raking leaves without any significant shoulder pain.  Around [place] time says she was moving boxes when she felt some discomfort in her shoulder which has been ongoing since.  She will get a sharp pain with certain motions of the shoulder, some occasional pain sleeping on her shoulder at night, pain over the anterior lateral aspect of her shoulder, she denies any radiation of pain.  She takes over-the-counter  medications as needed for pain, Dr. Clark  recommended follow-up with myself today for further evaluation.      No past medical history on file.    Medication Documentation Review Audit       Reviewed by Gerardo Clark MD (Physician) on 03/03/25 at 1103      Medication Order Taking? Sig Documenting Provider Last Dose Status   azelastine (Astelin) 137 mcg (0.1 %) nasal spray [id number] Yes Administer 2 sprays into each nostril 2 times a day. Gerardo Clark MD  Active   betamethasone dipropionate (Diprolene) 0.05 % ointment [id number] Yes Apply topically once daily. Historical Provider, MD  Active   black cohosh 540 mg capsule [id number] Yes Take by mouth. Historical Provider, MD  Active   cholecalciferol (Vitamin D-3) 50 mcg (2,000 unit) capsule [id number] Yes Take 5 capsules (250 mcg) by mouth early in the morning.. Historical Provider, MD  Active   estradiol (Estrace) 0.01 % (0.1 mg/gram) vaginal cream [id number] Yes use 1 gram VAGINALLY Once a day 90 days Historical Provider, MD  Active   hydroCHLOROthiazide (HYDRODiuril) 25 mg tablet [id number] Yes TAKE 1 TABLET ONCE DAILY Gerardo Clark MD  Active   levocetirizine (Xyzal) 5 mg tablet [id number] Yes TAKE 1 TABLET ONCE DAILY INTHE EVENING Gerardo Clark MD  Active   levothyroxine (Synthroid, Levoxyl) 75 mcg tablet [id number] Yes Take 1 tablet (75 mcg) by mouth early in the morning.. Take on an empty stomach at the same time each day, either 30 to 60 minutes prior to breakfast Gerardo Clark MD  Active   meloxicam (Mobic) 7.5 mg tablet [id number] Yes Take 1 tablet (7.5 mg) by mouth once daily. Gerardo Clark MD  Active   metroNIDAZOLE (MetrogeL) 1 % gel [id number] Yes Apply topically once daily. Historical Provider, MD  Active   omeprazole (PriLOSEC) 20 mg DR capsule [id number] Yes TAKE 1 CAPSULE DAILY EVERY MORNING BEFORE BREAKFAST Gerardo Clark MD  Active   tirzepatide, weight loss, (Zepbound) 12.5 mg/0.5 mL injection [id number] Yes Inject 12.5 mg under the skin every 7 days.  Gerardo Clark MD  Active                    Allergies   Allergen Reactions   • Codeine Nausea/vomiting       Social History     Socioeconomic History   • Marital status:      Spouse name: Not on file   • Number of children: Not on file   • Years of education: Not on file   • Highest education level: Not on file   Occupational History   • Not on file   Tobacco Use   • Smoking status: Never     Passive exposure: Never   • Smokeless tobacco: Never   Substance and Sexual Activity   • Alcohol use: Yes     Comment: occasional wine   • Drug use: Never   • Sexual activity: Not on file   Other Topics Concern   • Not on file   Social History Narrative   • Not on file     Social Drivers of Health     Financial Resource Strain: Not on file   Food Insecurity: Not on file   Transportation Needs: Not on file   Physical Activity: Not on file   Stress: Not on file   Social Connections: Not on file   Intimate Partner Violence: Not on file   Housing Stability: Not on file       Past Surgical History:   Procedure Laterality Date   • APPENDECTOMY  03/25/2014    Appendectomy   • BACK SURGERY  03/25/2014    Back Surgery   • OTHER SURGICAL HISTORY  03/25/2014    Cystoscopy With Pyeloscopy With Removal Of Calculus   • TUBAL LIGATION  03/25/2014    Tubal Ligation          Review of Systems   GENERAL: Negative  GI: Negative  MUSCULOSKELETAL: See HPI  SKIN: Negative  NEURO:  Negative     Physical Exam:    General/Constitutional: well appearing, no distress, appears stated age  HEENT: sclera clear  Respiratory: non labored breathing  Vascular: No edema, swelling or tenderness, except as noted in detailed exam.  Integumentary: No impressive skin lesions present, except as noted in detailed exam.  Neurological:  Alert and oriented   Psychological:  Normal mood and affect.  Musculoskeletal: Normal, except as noted in detailed exam and in HPI    Right shoulder: Normal appearance, no skin changes, no muscle atrophy.  She has relatively  preserved range of motion equal to the left, positive painful arc with abduction, internal rotation lumbar spine.  There is no significant weakness with rotator cuff strength testing.  She does have some pain with resisted external rotation and abduction.  There is some discomfort with Culver and Neer's test.  Positive [place], negative Speed, negative Yergason.       Imaging: X-ray of right shoulder obtained today and independently reviewed, there is moderate to advanced degenerative changes of the AC joint with joint space narrowing, osteophytosis, minimal degenerative changes of the glenohumeral joint.     Previous MRI of right shoulder from 2022 was reviewed, there was evidence of high-grade partial-thickness tear of the distal supraspinatus with moderate tendinosis, external impingement from moderate to advanced AC joint arthritis with inferior osteophytosis with associated subacromial bursitis.    L Inj/Asp: R subacromial bursa on 3/4/2025 10:42 AM  Indications: pain  Details: 22 G needle, ultrasound-guided lateral approach  Medications: 40 mg triamcinolone acetonide 40 mg/mL; 2 mL lidocaine 20 mg/mL (2 %)  Outcome: tolerated well, no immediate complications    Right shoulder subacromial bursa and surrounding structures were well-visualized with ultrasound both before and during injection    Images were permanently uploaded to patient's medical record/PACS.  Procedure, treatment alternatives, risks and benefits explained, specific risks discussed. Consent was given by the patient. Immediately prior to procedure a time out was called to verify the correct patient, procedure, equipment, support staff and site/side marked as required. Patient was prepped and draped in the usual sterile fashion.               Assessment   1. Rotator cuff impingement syndrome of right shoulder        2. Acute pain of right shoulder  XR shoulder right 2+ views    Point of Care Ultrasound      3. Rotator cuff tendonitis, right   Referral to Sports Medicine            Plan: Discussed diagnosis, reviewed x-ray as well as previous MRI findings.  We did proceed with ultrasound-guided subacromial injection with Kenalog today.  She tolerated without issue, she will monitor for improvement in symptoms over the next 1 to 2 weeks.  She can progress back to activities as tolerated.  She will plan to follow-up as symptoms dictate.

## 2025-03-05 DIAGNOSIS — K21.9 GASTROESOPHAGEAL REFLUX DISEASE WITHOUT ESOPHAGITIS: ICD-10-CM

## 2025-03-12 DIAGNOSIS — K21.9 GASTROESOPHAGEAL REFLUX DISEASE WITHOUT ESOPHAGITIS: ICD-10-CM

## 2025-03-12 RX ORDER — OMEPRAZOLE 20 MG/1
20 CAPSULE, DELAYED RELEASE ORAL
Qty: 90 CAPSULE | Refills: 3 | Status: SHIPPED | OUTPATIENT
Start: 2025-03-12

## 2025-03-12 RX ORDER — OMEPRAZOLE 20 MG/1
20 CAPSULE, DELAYED RELEASE ORAL
Qty: 90 CAPSULE | Refills: 3 | Status: SHIPPED | OUTPATIENT
Start: 2025-03-12 | End: 2025-03-12 | Stop reason: SDUPTHER

## 2025-03-25 ASSESSMENT — DERMATOLOGY QUALITY OF LIFE (QOL) ASSESSMENT
WHAT SINGLE SKIN CONDITION LISTED BELOW IS THE PATIENT ANSWERING THE QUALITY-OF-LIFE ASSESSMENT QUESTIONS ABOUT: NONE OF THE ABOVE
RATE HOW BOTHERED YOU ARE BY SYMPTOMS OF YOUR SKIN PROBLEM (EG, ITCHING, STINGING BURNING, HURTING OR SKIN IRRITATION): 0 - NEVER BOTHERED
RATE HOW EMOTIONALLY BOTHERED YOU ARE BY YOUR SKIN PROBLEM (FOR EXAMPLE, WORRY, EMBARRASSMENT, FRUSTRATION): 0 - NEVER BOTHERED
RATE HOW BOTHERED YOU ARE BY EFFECTS OF YOUR SKIN PROBLEMS ON YOUR ACTIVITIES (EG, GOING OUT, ACCOMPLISHING WHAT YOU WANT, WORK ACTIVITIES OR YOUR RELATIONSHIPS WITH OTHERS): 0 - NEVER BOTHERED
RATE HOW BOTHERED YOU ARE BY EFFECTS OF YOUR SKIN PROBLEMS ON YOUR ACTIVITIES (EG, GOING OUT, ACCOMPLISHING WHAT YOU WANT, WORK ACTIVITIES OR YOUR RELATIONSHIPS WITH OTHERS): 0 - NEVER BOTHERED
RATE HOW BOTHERED YOU ARE BY SYMPTOMS OF YOUR SKIN PROBLEM (EG, ITCHING, STINGING BURNING, HURTING OR SKIN IRRITATION): 0 - NEVER BOTHERED
RATE HOW EMOTIONALLY BOTHERED YOU ARE BY YOUR SKIN PROBLEM (FOR EXAMPLE, WORRY, EMBARRASSMENT, FRUSTRATION): 0 - NEVER BOTHERED
DATE THE QUALITY-OF-LIFE ASSESSMENT WAS COMPLETED: 67289
WHAT SINGLE SKIN CONDITION LISTED BELOW IS THE PATIENT ANSWERING THE QUALITY-OF-LIFE ASSESSMENT QUESTIONS ABOUT: NONE OF THE ABOVE

## 2025-03-25 ASSESSMENT — PATIENT GLOBAL ASSESSMENT (PGA): WHAT IS THE PGA: PATIENT GLOBAL ASSESSMENT:  1 - CLEAR

## 2025-03-31 ENCOUNTER — APPOINTMENT (OUTPATIENT)
Dept: DERMATOLOGY | Facility: CLINIC | Age: 70
End: 2025-03-31
Payer: MEDICARE

## 2025-03-31 DIAGNOSIS — Z12.83 ENCOUNTER FOR SCREENING FOR MALIGNANT NEOPLASM OF SKIN: Primary | ICD-10-CM

## 2025-03-31 PROCEDURE — 1160F RVW MEDS BY RX/DR IN RCRD: CPT | Performed by: NURSE PRACTITIONER

## 2025-03-31 PROCEDURE — 99203 OFFICE O/P NEW LOW 30 MIN: CPT | Performed by: NURSE PRACTITIONER

## 2025-03-31 PROCEDURE — 1159F MED LIST DOCD IN RCRD: CPT | Performed by: NURSE PRACTITIONER

## 2025-03-31 PROCEDURE — 1157F ADVNC CARE PLAN IN RCRD: CPT | Performed by: NURSE PRACTITIONER

## 2025-03-31 PROCEDURE — 1036F TOBACCO NON-USER: CPT | Performed by: NURSE PRACTITIONER

## 2025-03-31 PROCEDURE — 1123F ACP DISCUSS/DSCN MKR DOCD: CPT | Performed by: NURSE PRACTITIONER

## 2025-03-31 RX ORDER — CLINDAMYCIN PHOSPHATE 20 MG/G
CREAM VAGINAL
COMMUNITY
Start: 2025-03-07

## 2025-03-31 NOTE — PROGRESS NOTES
Subjective     Kavya Merchant is a 69 y.o. female who presents for the following: Suspicious Skin Lesion (Pt presents to office for evaluation of lesions of concern on the face. Pt denies history of skin cancer.).     Review of Systems:  No other skin or systemic complaints other than what is documented elsewhere in the note.    The following portions of the chart were reviewed this encounter and updated as appropriate:  Tobacco  Allergies  Meds  Problems  Med Hx  Surg Hx  Fam Hx         Skin Cancer History  No skin cancer on file.      Specialty Problems          Dermatology Problems    Acne rosacea    Skin lesion of face        Objective   Well appearing patient in no apparent distress; mood and affect are within normal limits.    A full examination was performed including scalp, head, eyes, ears, nose, lips, neck, chest, axillae, abdomen, back, buttocks, bilateral upper extremities, bilateral lower extremities, hands, feet, fingers, toes, fingernails, and toenails. All findings within normal limits unless otherwise noted below.    Assessment/Plan   1. Encounter for screening for malignant neoplasm of skin  Head - Anterior (Face)  Scattered benign lesions    - Protective measures, such as avoiding skin exposure to sunlight during peak sun hours (10 AM to 3 PM), wearing protective clothing, and applying high-SPF sunscreen, are essential for reducing exposure to harmful ultraviolet (UV) light.  - Monthly self-examination of the skin is helpful to detect new lesions or changes in existing lesions.  - Discussed signs and symptoms of sun-related skin cancers.   - Make sure your moles are not signs of skin cancer (melanoma). Remember the ABCDEs of melanoma lesions:  A - Asymmetry: One half of the lesion does not mirror the other half.  B - Border: The borders are irregular or vague (indistinct).  C - Color: More than one color may be noted within the mole.  D - Diameter: Size greater than 6 mm (roughly the size of  a pencil eraser) may be concerning.  E - Evolving: Notable changes in the lesion over time are suspicious signs for skin cancer.      Follow up in 12 months for a total body skin exam. Please call me if there are any changes or development of concerning symptoms (lesion/skin condition is changing, bleeding, enlarging, or worsening).

## 2025-04-22 ENCOUNTER — APPOINTMENT (OUTPATIENT)
Dept: PHARMACY | Facility: HOSPITAL | Age: 70
End: 2025-04-22
Payer: MEDICARE

## 2025-04-22 DIAGNOSIS — E66.09 CLASS 1 OBESITY DUE TO EXCESS CALORIES WITH SERIOUS COMORBIDITY AND BODY MASS INDEX (BMI) OF 33.0 TO 33.9 IN ADULT: ICD-10-CM

## 2025-04-22 DIAGNOSIS — E66.811 CLASS 1 OBESITY DUE TO EXCESS CALORIES WITH SERIOUS COMORBIDITY AND BODY MASS INDEX (BMI) OF 33.0 TO 33.9 IN ADULT: ICD-10-CM

## 2025-04-22 PROCEDURE — RXMED WILLOW AMBULATORY MEDICATION CHARGE

## 2025-04-22 NOTE — PROGRESS NOTES
Subjective   Patient ID: Kavya Merchant is a 69 y.o. female who presents for Weight Loss.    Referring Provider: Gerardo Clark MD     [facility] significant for hypothyroidism and arthritis. Has participated in Weight Watchers for several years. She keeps active with gardening and yardwork over 2 acres of property. She takes Zepbound on Saturdays and started on 3/2/24.     Starting Weight: 219 lbs   Current Weight: 176 lbs     Review of Systems    Medication System Management:  Affordability/Accessibility: No issues  Adherence/Organization: No issues  Adverse Effects: None    Objective     There were no vitals taken for this visit.     Labs  Lab Results   Component Value Date    BILITOT 0.7 02/25/2025    CALCIUM 9.7 02/25/2025    CO2 28 02/25/2025     02/25/2025    CREATININE 0.97 02/25/2025    GLUCOSE 79 02/25/2025    ALKPHOS 86 02/25/2025    K 3.8 02/25/2025    PROT 6.8 02/25/2025     02/25/2025    AST 22 02/25/2025    ALT 13 02/25/2025    BUN 26 (H) 02/25/2025    ANIONGAP 9 02/25/2025    MG 2.0 02/25/2025    ALBUMIN 4.4 02/25/2025    GFRF 66 08/21/2023     Lab Results   Component Value Date    TRIG 77 02/25/2025    CHOL 120 02/25/2025    LDLCALC 42 02/25/2025    HDL 62 02/25/2025     Lab Results   Component Value Date    HGBA1C 5.3 02/25/2025       Current Outpatient Medications on File Prior to Visit   Medication Sig Dispense Refill    azelastine (Astelin) 137 mcg (0.1 %) nasal spray Administer 2 sprays into each nostril 2 times a day. 90 mL 3    betamethasone dipropionate (Diprolene) 0.05 % ointment Apply topically once daily.      black cohosh 540 mg capsule Take by mouth.      cholecalciferol (Vitamin D-3) 50 mcg (2,000 unit) capsule Take 5 capsules (250 mcg) by mouth early in the morning..      clindamycin (Cleocin) 2 % vaginal cream Use 1 applicatorful vaginally at bedtime for 3 day(s)      cyanocobalamin (Vitamin B-12) 1,000 mcg tablet Take 1 tablet (1,000 mcg) by mouth once daily. 100 tablet 3     estradiol (Estrace) 0.01 % (0.1 mg/gram) vaginal cream use 1 gram VAGINALLY Once a day 90 days      hydroCHLOROthiazide (HYDRODiuril) 25 mg tablet TAKE 1 TABLET ONCE DAILY 90 tablet 3    levocetirizine (Xyzal) 5 mg tablet TAKE 1 TABLET ONCE DAILY INTHE EVENING 90 tablet 3    levothyroxine (Synthroid, Levoxyl) 75 mcg tablet Take 1 tablet (75 mcg) by mouth early in the morning.. Take on an empty stomach at the same time each day, either 30 to 60 minutes prior to breakfast 30 tablet 11    meloxicam (Mobic) 7.5 mg tablet Take 1 tablet (7.5 mg) by mouth once daily. 90 tablet 3    metroNIDAZOLE (MetrogeL) 1 % gel Apply topically once daily.      omeprazole (PriLOSEC) 20 mg DR capsule Take 1 capsule (20 mg) by mouth once daily in the morning. Take before meals. 90 capsule 3    [DISCONTINUED] tirzepatide, weight loss, (Zepbound) 12.5 mg/0.5 mL injection Inject 12.5 mg under the skin every 7 days. 6 mL 0     No current facility-administered medications on file prior to visit.        Assessment/Plan   Problem List Items Addressed This Visit           ICD-10-CM    BMI 31.0-31.9,adult - Primary Z68.31    Patient was referred for titration of Zepbound for weight loss.  Since last visit the patient continued the dose of Zepbound and reports no side effects. She has lost about 45 lbs total. The patient has noticed that her clothes are fitting better.  Discussed increasing the dose however patient would like to remain on current dose since she is still losing weight.      PLAN:  - Continue Zepbound 12.5 mg once weekly injection Prescription sent to [facility] pharmacy to be mailed to patient.           Relevant Orders    Referral to Clinical Pharmacy     Other Visit Diagnoses         Codes      Class 1 obesity due to excess calories with serious comorbidity and body mass index (BMI) of 33.0 to 33.9 in adult     E66.811, E66.09, Z68.33    Relevant Medications    tirzepatide, weight loss, (Zepbound) 12.5 mg/0.5 mL injection           Follow up: 3 months    Snow Wooten PharmD     Continue all meds under the continuation of care with the referring provider and clinical pharmacy team.    Verbal consent to manage patient's drug therapy was obtained from the patient. They were informed they may decline to participate or withdraw from participation in pharmacy services at any time.

## 2025-04-22 NOTE — ASSESSMENT & PLAN NOTE
Patient was referred for titration of Zepbound for weight loss.  Since last visit the patient continued the dose of Zepbound and reports no side effects. She has lost about 45 lbs total. The patient has noticed that her clothes are fitting better.  Discussed increasing the dose however patient would like to remain on current dose since she is still losing weight.      PLAN:  - Continue Zepbound 12.5 mg once weekly injection Prescription sent to [facility] pharmacy to be mailed to patient.

## 2025-04-23 ENCOUNTER — PHARMACY VISIT (OUTPATIENT)
Dept: PHARMACY | Facility: CLINIC | Age: 70
End: 2025-04-23
Payer: MEDICARE

## 2025-05-30 LAB — VIT B12 SERPL-MCNC: 1146 PG/ML (ref 200–1100)

## 2025-05-31 LAB
25(OH)D3+25(OH)D2 SERPL-MCNC: 48 NG/ML (ref 30–100)
ALBUMIN SERPL-MCNC: 4.6 G/DL (ref 3.6–5.1)
ALP SERPL-CCNC: 75 U/L (ref 37–153)
ALT SERPL-CCNC: 17 U/L (ref 6–29)
ANION GAP SERPL CALCULATED.4IONS-SCNC: 11 MMOL/L (CALC) (ref 7–17)
AST SERPL-CCNC: 28 U/L (ref 10–35)
BASOPHILS # BLD AUTO: 62 CELLS/UL (ref 0–200)
BASOPHILS NFR BLD AUTO: 1.2 %
BILIRUB SERPL-MCNC: 0.6 MG/DL (ref 0.2–1.2)
BUN SERPL-MCNC: 22 MG/DL (ref 7–25)
CALCIUM SERPL-MCNC: 9.6 MG/DL (ref 8.6–10.4)
CHLORIDE SERPL-SCNC: 101 MMOL/L (ref 98–110)
CHOLEST SERPL-MCNC: 126 MG/DL
CHOLEST/HDLC SERPL: 1.9 (CALC)
CO2 SERPL-SCNC: 29 MMOL/L (ref 20–32)
CREAT SERPL-MCNC: 1 MG/DL (ref 0.6–1)
EGFRCR SERPLBLD CKD-EPI 2021: 61 ML/MIN/1.73M2
EOSINOPHIL # BLD AUTO: 151 CELLS/UL (ref 15–500)
EOSINOPHIL NFR BLD AUTO: 2.9 %
ERYTHROCYTE [DISTWIDTH] IN BLOOD BY AUTOMATED COUNT: 11.9 % (ref 11–15)
EST. AVERAGE GLUCOSE BLD GHB EST-MCNC: 100 MG/DL
EST. AVERAGE GLUCOSE BLD GHB EST-SCNC: 5.5 MMOL/L
GLUCOSE SERPL-MCNC: 81 MG/DL (ref 65–99)
HBA1C MFR BLD: 5.1 %
HCT VFR BLD AUTO: 46 % (ref 35–45)
HDLC SERPL-MCNC: 66 MG/DL
HGB BLD-MCNC: 14.8 G/DL (ref 11.7–15.5)
LDLC SERPL CALC-MCNC: 41 MG/DL (CALC)
LYMPHOCYTES # BLD AUTO: 1893 CELLS/UL (ref 850–3900)
LYMPHOCYTES NFR BLD AUTO: 36.4 %
MCH RBC QN AUTO: 30.6 PG (ref 27–33)
MCHC RBC AUTO-ENTMCNC: 32.2 G/DL (ref 32–36)
MCV RBC AUTO: 95.2 FL (ref 80–100)
MONOCYTES # BLD AUTO: 442 CELLS/UL (ref 200–950)
MONOCYTES NFR BLD AUTO: 8.5 %
NEUTROPHILS # BLD AUTO: 2652 CELLS/UL (ref 1500–7800)
NEUTROPHILS NFR BLD AUTO: 51 %
NONHDLC SERPL-MCNC: 60 MG/DL (CALC)
PLATELET # BLD AUTO: 224 THOUSAND/UL (ref 140–400)
PMV BLD REES-ECKER: 11.3 FL (ref 7.5–12.5)
POTASSIUM SERPL-SCNC: 3.9 MMOL/L (ref 3.5–5.3)
PROT SERPL-MCNC: 6.8 G/DL (ref 6.1–8.1)
RBC # BLD AUTO: 4.83 MILLION/UL (ref 3.8–5.1)
SODIUM SERPL-SCNC: 141 MMOL/L (ref 135–146)
TRIGL SERPL-MCNC: 106 MG/DL
TSH SERPL-ACNC: 2.29 MIU/L (ref 0.4–4.5)
WBC # BLD AUTO: 5.2 THOUSAND/UL (ref 3.8–10.8)

## 2025-06-03 DIAGNOSIS — E78.5 HYPERLIPIDEMIA, UNSPECIFIED HYPERLIPIDEMIA TYPE: ICD-10-CM

## 2025-06-03 DIAGNOSIS — E55.9 VITAMIN D DEFICIENCY: ICD-10-CM

## 2025-06-03 DIAGNOSIS — E03.9 HYPOTHYROIDISM, UNSPECIFIED TYPE: ICD-10-CM

## 2025-06-03 DIAGNOSIS — I10 HYPERTENSION, UNSPECIFIED TYPE: ICD-10-CM

## 2025-06-03 DIAGNOSIS — Z09 ENCOUNTER FOR FOLLOW-UP: ICD-10-CM

## 2025-06-03 DIAGNOSIS — E53.8 VITAMIN B12 DEFICIENCY: ICD-10-CM

## 2025-06-03 DIAGNOSIS — R73.9 ELEVATED BLOOD SUGAR LEVEL: ICD-10-CM

## 2025-07-08 ENCOUNTER — APPOINTMENT (OUTPATIENT)
Dept: PHARMACY | Facility: HOSPITAL | Age: 70
End: 2025-07-08
Payer: MEDICARE

## 2025-07-08 DIAGNOSIS — E66.811 CLASS 1 OBESITY DUE TO EXCESS CALORIES WITH SERIOUS COMORBIDITY AND BODY MASS INDEX (BMI) OF 33.0 TO 33.9 IN ADULT: ICD-10-CM

## 2025-07-08 DIAGNOSIS — E66.09 CLASS 1 OBESITY DUE TO EXCESS CALORIES WITH SERIOUS COMORBIDITY AND BODY MASS INDEX (BMI) OF 33.0 TO 33.9 IN ADULT: ICD-10-CM

## 2025-07-08 PROCEDURE — RXMED WILLOW AMBULATORY MEDICATION CHARGE

## 2025-07-08 NOTE — ASSESSMENT & PLAN NOTE
Patient was referred for titration of Zepbound for weight loss.  Since last visit the patient continued the dose of Zepbound and reports no side effects. She has lost about 45 lbs total. The patient has noticed that her clothes are fitting better.  Discussed increasing the dose however patient would like to remain on current dose since she is still losing weight.      PLAN:  - Continue Zepbound 12.5 mg once weekly injection Prescription sent to [facility] pharmacy to be mailed to patient.

## 2025-07-08 NOTE — PROGRESS NOTES
Subjective   Patient ID: Kavya Merchant is a 69 y.o. female who presents for Weight Loss.    Referring Provider: Gerardo Clark MD     HPI  [facility] significant for hypothyroidism and arthritis. Has participated in Weight Watchers for several years. She keeps active with gardening and yardwork over 2 acres of property. She takes Zepbound on Saturdays and started on 3/2/24.     Starting Weight: 219 lbs   Current Weight: 173 lbs     Review of Systems    Medication System Management:  Affordability/Accessibility: No issues  Adherence/Organization: No issues  Adverse Effects: None    Objective     There were no vitals taken for this visit.     Labs  Lab Results   Component Value Date    BILITOT 0.6 05/30/2025    CALCIUM 9.6 05/30/2025    CO2 29 05/30/2025     05/30/2025    CREATININE 1.00 05/30/2025    GLUCOSE 81 05/30/2025    ALKPHOS 75 05/30/2025    K 3.9 05/30/2025    PROT 6.8 05/30/2025     05/30/2025    AST 28 05/30/2025    ALT 17 05/30/2025    BUN 22 05/30/2025    ANIONGAP 11 05/30/2025    MG 2.0 02/25/2025    ALBUMIN 4.6 05/30/2025    GFRF 66 08/21/2023     Lab Results   Component Value Date    TRIG 106 05/30/2025    CHOL 126 05/30/2025    LDLCALC 41 05/30/2025    HDL 66 05/30/2025     Lab Results   Component Value Date    HGBA1C 5.1 05/30/2025       Current Outpatient Medications on File Prior to Visit   Medication Sig Dispense Refill    cyanocobalamin (Vitamin B-12) 1,000 mcg tablet Take 1 tablet (1,000 mcg) by mouth once daily. (Patient taking differently: Take 1 tablet (1,000 mcg) by mouth every other day.) 100 tablet 3    azelastine (Astelin) 137 mcg (0.1 %) nasal spray Administer 2 sprays into each nostril 2 times a day. 90 mL 3    betamethasone dipropionate (Diprolene) 0.05 % ointment Apply topically once daily.      black cohosh 540 mg capsule Take by mouth.      cholecalciferol (Vitamin D-3) 50 mcg (2,000 unit) capsule Take 5 capsules (250 mcg) by mouth early in the morning..      clindamycin  (Cleocin) 2 % vaginal cream Use 1 applicatorful vaginally at bedtime for 3 day(s)      estradiol (Estrace) 0.01 % (0.1 mg/gram) vaginal cream use 1 gram VAGINALLY Once a day 90 days      hydroCHLOROthiazide (HYDRODiuril) 25 mg tablet TAKE 1 TABLET ONCE DAILY 90 tablet 3    levocetirizine (Xyzal) 5 mg tablet TAKE 1 TABLET ONCE DAILY INTHE EVENING 90 tablet 3    levothyroxine (Synthroid, Levoxyl) 75 mcg tablet Take 1 tablet (75 mcg) by mouth early in the morning.. Take on an empty stomach at the same time each day, either 30 to 60 minutes prior to breakfast 30 tablet 11    meloxicam (Mobic) 7.5 mg tablet Take 1 tablet (7.5 mg) by mouth once daily. 90 tablet 3    metroNIDAZOLE (MetrogeL) 1 % gel Apply topically once daily.      omeprazole (PriLOSEC) 20 mg DR capsule Take 1 capsule (20 mg) by mouth once daily in the morning. Take before meals. 90 capsule 3    [DISCONTINUED] tirzepatide, weight loss, (Zepbound) 12.5 mg/0.5 mL injection Inject 12.5 mg under the skin every 7 days. 6 mL 0     No current facility-administered medications on file prior to visit.        Assessment/Plan   Problem List Items Addressed This Visit           ICD-10-CM    BMI 31.0-31.9,adult - Primary Z68.31    Patient was referred for titration of Zepbound for weight loss.  Since last visit the patient continued the dose of Zepbound and reports no side effects. She has lost about 45 lbs total. The patient has noticed that her clothes are fitting better.  Discussed increasing the dose however patient would like to remain on current dose since she is still losing weight.      PLAN:  - Continue Zepbound 12.5 mg once weekly injection Prescription sent to [facility] pharmacy to be mailed to patient.           Relevant Orders    Referral to Clinical Pharmacy     Other Visit Diagnoses         Codes      Class 1 obesity due to excess calories with serious comorbidity and body mass index (BMI) of 33.0 to 33.9 in adult     E66.811, E66.09, Z68.33    Relevant  Medications    tirzepatide, weight loss, (Zepbound) 12.5 mg/0.5 mL injection    Other Relevant Orders    Referral to Clinical Pharmacy          Follow up: 3 months    Snow Wooten PharmD     Continue all meds under the continuation of care with the referring provider and clinical pharmacy team.    Verbal consent to manage patient's drug therapy was obtained from the patient. They were informed they may decline to participate or withdraw from participation in pharmacy services at any time.

## 2025-07-09 ENCOUNTER — PHARMACY VISIT (OUTPATIENT)
Dept: PHARMACY | Facility: CLINIC | Age: 70
End: 2025-07-09
Payer: MEDICARE

## 2025-07-09 DIAGNOSIS — M17.0 PRIMARY OSTEOARTHRITIS OF BOTH KNEES: ICD-10-CM

## 2025-07-09 RX ORDER — MELOXICAM 7.5 MG/1
7.5 TABLET ORAL DAILY
Qty: 90 TABLET | Refills: 3 | Status: SHIPPED | OUTPATIENT
Start: 2025-07-09

## 2025-07-17 ENCOUNTER — TELEPHONE (OUTPATIENT)
Dept: PRIMARY CARE | Facility: CLINIC | Age: 70
End: 2025-07-17
Payer: MEDICARE

## 2025-07-17 DIAGNOSIS — K21.9 GASTROESOPHAGEAL REFLUX DISEASE WITHOUT ESOPHAGITIS: ICD-10-CM

## 2025-07-17 RX ORDER — OMEPRAZOLE 20 MG/1
20 CAPSULE, DELAYED RELEASE ORAL
Qty: 90 CAPSULE | Refills: 3 | Status: SHIPPED | OUTPATIENT
Start: 2025-07-17

## 2025-07-17 NOTE — TELEPHONE ENCOUNTER
Pt called in stating she wanted to know if her prescription for omeprazole (PriLOSEC) 20 mg DR capsule can be sent over to [facility] instead if mail order due to leaving out of town this Sunday

## 2025-09-30 ENCOUNTER — APPOINTMENT (OUTPATIENT)
Dept: PRIMARY CARE | Facility: CLINIC | Age: 70
End: 2025-09-30
Payer: MEDICARE

## 2025-10-07 ENCOUNTER — APPOINTMENT (OUTPATIENT)
Dept: PHARMACY | Facility: HOSPITAL | Age: 70
End: 2025-10-07
Payer: MEDICARE